# Patient Record
Sex: FEMALE | Race: WHITE | Employment: OTHER | URBAN - METROPOLITAN AREA
[De-identification: names, ages, dates, MRNs, and addresses within clinical notes are randomized per-mention and may not be internally consistent; named-entity substitution may affect disease eponyms.]

---

## 2017-12-20 ENCOUNTER — HOSPITAL ENCOUNTER (OUTPATIENT)
Dept: RADIATION ONCOLOGY | Age: 72
Discharge: HOME | End: 2017-12-20
Payer: MEDICARE

## 2017-12-20 VITALS
SYSTOLIC BLOOD PRESSURE: 138 MMHG | BODY MASS INDEX: 37.5 KG/M2 | DIASTOLIC BLOOD PRESSURE: 86 MMHG | HEIGHT: 60 IN | WEIGHT: 191 LBS | OXYGEN SATURATION: 98 % | RESPIRATION RATE: 17 BRPM | HEART RATE: 89 BPM | TEMPERATURE: 97 F

## 2017-12-20 PROCEDURE — 99212 OFFICE O/P EST SF 10 MIN: CPT | Performed by: RADIOLOGY

## 2017-12-20 RX ORDER — PRAVASTATIN SODIUM 40 MG
40 TABLET ORAL
COMMUNITY
Start: 2017-10-10 | End: 2021-06-16

## 2017-12-20 RX ORDER — ALLOPURINOL 100 MG/1
100 TABLET ORAL
COMMUNITY
Start: 2017-10-10 | End: 2021-06-16

## 2017-12-20 RX ORDER — GABAPENTIN 100 MG/1
100 CAPSULE ORAL 2 TIMES DAILY
COMMUNITY
End: 2017-12-20 | Stop reason: DRUGHIGH

## 2017-12-20 RX ORDER — GABAPENTIN 300 MG/1
300 CAPSULE ORAL 2 TIMES DAILY
COMMUNITY
Start: 2017-10-10

## 2017-12-20 RX ORDER — ROPINIROLE 1 MG/1
1 TABLET, FILM COATED ORAL NIGHTLY
COMMUNITY
Start: 2017-11-27

## 2017-12-20 RX ORDER — NICOTINE POLACRILEX 4 MG/1
20 GUM, CHEWING ORAL 2 TIMES DAILY
COMMUNITY

## 2017-12-20 NOTE — ONCOLOGY
RADIATION ONCOLOGY FOLLOW-UP    DATE OF VISIT: 12/20/2017    DIAGNOSIS: Diffuse large B-cell lymphoma left tonsil, s/p R-CHOP x 3 followed by involved field RT 30Gy in 15fx, completing 7/22/16.     TIME SINCE TREATMENT: 1.5 months     INTERVAL HISTORY:  the patient has not been seen for about a year. She has been following closely with Dr. Pauline Osborn. All of the radiation dose was low, the patient had a great difficulty getting through therapy secondary to loss of taste, nausea, and odynophagia. She required IV fluid hydration at medical oncology in that time period, and also spent some time in a nursing home after completion of therapy. Although we had discussed it at the time of consultation and radiation planning, the patient has no recollection of the need for fluoride trays and the risks of xerostomia and dental decay. She does note some dryness of her mouth, and she has substantial dental decay. She has not been followed with dentistry, and never had fluoride trays fitted. I would expect that her xerostomia would be minimal, as only involved field oropharyngeal radiotherapy was given, and overall the dose was low. Today she came in because her anterior tongue has been irritating her, and she had called and requested Magic Mouthwash. She believes this is related to distant radiotherapy. She also has some dental decay, and plans see a dentist in early 2018. She has had no recent chemotherapy.     PAST MEDICAL HISTORY:   Past Medical History:   Diagnosis Date    Anxiety     Cancer ([facility] [address]     stage1 DLBCL of left tonsil    Gout     Hyperlipidemia     Hypertension        PAST SURGICAL HISTORY:  Past Surgical History:   Procedure Laterality Date    APPENDECTOMY      CHOLECYSTECTOMY      HYSTERECTOMY, VAGINAL      JOINT REPLACEMENT          History   Smoking Status    Never Smoker   Smokeless Tobacco    Never Used     History   Alcohol Use No       ALLERGIES:   No Known Allergies     CURRENT near-complete extractions. It would be best for quality of life if a couple of teeth could be left in place in the upper and lower jaw, for better fitting of dentures. She plans to see a dentist shortly, and I imagine there will be a referral made to oral surgery to take care of whatever extractions are required. Viscous lidocaine was prescribed today to provide some comfort for the tongue irritation, until the cause of the problem can be addressed. Electronically signed by Demetrio Sullivan MD on 12/20/17 at 1:51 PM      Thank you for allowing us to participate in the care of this patient.   cc:   No att. providers found  MD Jordon Short, [address] [facility] [address]  [facility], [facility]

## 2020-11-18 ENCOUNTER — HOSPITAL ENCOUNTER (OUTPATIENT)
Dept: MRI IMAGING | Age: 75
Discharge: HOME | End: 2020-11-20
Payer: MEDICARE

## 2020-11-18 PROCEDURE — 70551 MRI BRAIN STEM W/O DYE: CPT

## 2020-11-23 ENCOUNTER — HOSPITAL ENCOUNTER (OUTPATIENT)
Dept: INTERVENTIONAL RADIOLOGY/VASCULAR | Age: 75
Discharge: HOME | End: 2020-11-25
Payer: MEDICARE

## 2020-11-23 PROCEDURE — 2709999900 IR PICC WO SQ PORT/PUMP > 5 YEARS

## 2020-11-23 PROCEDURE — 36573 INSJ PICC RS&I 5 YR+: CPT | Performed by: RADIOLOGY

## 2020-11-23 PROCEDURE — 2500000003 HC RX 250 WO HCPCS: Performed by: INTERNAL MEDICINE

## 2020-11-23 PROCEDURE — 2580000003 HC RX 258: Performed by: INTERNAL MEDICINE

## 2020-11-23 RX ORDER — LIDOCAINE HYDROCHLORIDE 20 MG/ML
5 INJECTION, SOLUTION INFILTRATION; PERINEURAL ONCE
Status: COMPLETED | OUTPATIENT
Start: 2020-11-23 | End: 2020-11-23

## 2020-11-23 RX ORDER — SODIUM CHLORIDE 9 MG/ML
250 INJECTION, SOLUTION INTRAVENOUS ONCE
Status: COMPLETED | OUTPATIENT
Start: 2020-11-23 | End: 2020-11-23

## 2020-11-23 RX ORDER — SODIUM CHLORIDE 0.9 % (FLUSH) 0.9 %
10 SYRINGE (ML) INJECTION PRN
Status: DISCONTINUED | OUTPATIENT
Start: 2020-11-23 | End: 2020-11-26 | Stop reason: HOSPADM

## 2020-11-23 RX ORDER — SODIUM CHLORIDE 0.9 % (FLUSH) 0.9 %
10 SYRINGE (ML) INJECTION EVERY 12 HOURS SCHEDULED
Status: DISCONTINUED | OUTPATIENT
Start: 2020-11-23 | End: 2020-11-26 | Stop reason: HOSPADM

## 2020-11-23 RX ADMIN — SODIUM CHLORIDE 250 ML: 9 INJECTION, SOLUTION INTRAVENOUS at 14:25

## 2020-11-23 RX ADMIN — LIDOCAINE HYDROCHLORIDE 5 ML: 20 INJECTION, SOLUTION INFILTRATION; PERINEURAL at 14:26

## 2020-11-23 ASSESSMENT — PAIN SCALES - GENERAL: PAINLEVEL_OUTOF10: 0

## 2021-01-13 ENCOUNTER — SOCIAL WORK (OUTPATIENT)
Dept: RADIATION ONCOLOGY | Age: 76
End: 2021-01-13

## 2021-01-13 NOTE — PROGRESS NOTES
GISELA referred to Pt via Dr. Asuncion Olmstead to offer support re: [employer]. GISELA met w/ Pt and sister-in-law who state a need to determine eligibility for Medicaid and/or caregiving resources. GISELA provided an educational pamphlet for [facility], which has an Aging and [address] Dept with [employer], who can assist with determining eligibility for various [employer] (including Medicaid). GISELA also provided an #800 number for The [employer]. This agency has various financial assistance programs and hardship funds of which Pt may be eligible. Sister-in-law agreed to call these two agencies on Pt behalf. GISELA provided contact # should future support needs arise. Family stated appreciation of resources.

## 2021-02-25 DIAGNOSIS — Z85.72 PERSONAL HISTORY OF NON-HODGKIN LYMPHOMAS: ICD-10-CM

## 2021-02-25 DIAGNOSIS — D64.9 ANEMIA, UNSPECIFIED TYPE: ICD-10-CM

## 2021-02-25 DIAGNOSIS — D59.9 ACQUIRED HEMOLYTIC ANEMIA (HCC): ICD-10-CM

## 2021-02-25 DIAGNOSIS — C83.35 DIFFUSE LARGE B-CELL LYMPHOMA OF LYMPH NODES OF INGUINAL REGION (HCC): ICD-10-CM

## 2021-02-25 DIAGNOSIS — M62.81 MUSCLE WEAKNESS (GENERALIZED): ICD-10-CM

## 2021-02-25 DIAGNOSIS — T45.1X5A ANEMIA DUE TO ANTINEOPLASTIC CHEMOTHERAPY: ICD-10-CM

## 2021-02-25 DIAGNOSIS — I82.401 ACUTE EMBOLISM AND THROMBOSIS OF UNSPECIFIED DEEP VEINS OF RIGHT LOWER EXTREMITY (HCC): ICD-10-CM

## 2021-02-25 DIAGNOSIS — D64.81 ANEMIA DUE TO ANTINEOPLASTIC CHEMOTHERAPY: ICD-10-CM

## 2021-02-25 DIAGNOSIS — C77.4 SECONDARY AND UNSPECIFIED MALIGNANT NEOPLASM OF INGUINAL AND LOWER LIMB LYMPH NODES (HCC): ICD-10-CM

## 2021-02-25 RX ORDER — ACETAMINOPHEN 325 MG/1
650 TABLET ORAL ONCE
Status: CANCELLED
Start: 2021-03-03 | End: 2021-03-02

## 2021-03-01 ENCOUNTER — HOSPITAL ENCOUNTER (OUTPATIENT)
Dept: INTERVENTIONAL RADIOLOGY/VASCULAR | Age: 76
Discharge: HOME | End: 2021-03-03
Payer: MEDICARE

## 2021-03-01 DIAGNOSIS — C83.35 DIFFUS LARGE B-CELL LYMPH, NODES OF ING RGN AND LOWER LIMB (HCC): ICD-10-CM

## 2021-03-01 PROCEDURE — 36573 INSJ PICC RS&I 5 YR+: CPT

## 2021-03-01 PROCEDURE — 2709999900 IR PICC WO SQ PORT/PUMP > 5 YEARS

## 2021-03-01 PROCEDURE — 2500000003 HC RX 250 WO HCPCS: Performed by: INTERNAL MEDICINE

## 2021-03-01 PROCEDURE — 2580000003 HC RX 258: Performed by: INTERNAL MEDICINE

## 2021-03-01 RX ORDER — SODIUM CHLORIDE 9 MG/ML
INJECTION, SOLUTION INTRAVENOUS CONTINUOUS
Status: DISCONTINUED | OUTPATIENT
Start: 2021-03-01 | End: 2021-03-04 | Stop reason: HOSPADM

## 2021-03-01 RX ORDER — LIDOCAINE HYDROCHLORIDE 20 MG/ML
5 INJECTION, SOLUTION INFILTRATION; PERINEURAL ONCE
Status: COMPLETED | OUTPATIENT
Start: 2021-03-01 | End: 2021-03-01

## 2021-03-01 RX ADMIN — SODIUM CHLORIDE: 9 INJECTION, SOLUTION INTRAVENOUS at 15:27

## 2021-03-01 RX ADMIN — LIDOCAINE HYDROCHLORIDE 5 ML: 20 INJECTION, SOLUTION INFILTRATION; PERINEURAL at 15:23

## 2021-03-01 NOTE — PROGRESS NOTES
1450 Pt here for PICC line insertion. Pt A&Ox4. Pt from [facility]. Allergies, history and medications verified with nursing home list. Pt on eliquis that was hold for 2 days. Pt states she had PICC lines in the past. Procedure explained. Consent signed.

## 2021-03-03 ENCOUNTER — HOSPITAL ENCOUNTER (OUTPATIENT)
Dept: INFUSION THERAPY | Age: 76
Setting detail: INFUSION SERIES
Discharge: HOME | End: 2021-03-03
Payer: MEDICARE

## 2021-03-03 VITALS
TEMPERATURE: 97.7 F | DIASTOLIC BLOOD PRESSURE: 67 MMHG | SYSTOLIC BLOOD PRESSURE: 139 MMHG | HEART RATE: 68 BPM | OXYGEN SATURATION: 99 % | RESPIRATION RATE: 18 BRPM

## 2021-03-03 DIAGNOSIS — C77.4 SECONDARY AND UNSPECIFIED MALIGNANT NEOPLASM OF INGUINAL AND LOWER LIMB LYMPH NODES (HCC): ICD-10-CM

## 2021-03-03 DIAGNOSIS — D64.81 ANEMIA DUE TO ANTINEOPLASTIC CHEMOTHERAPY: ICD-10-CM

## 2021-03-03 DIAGNOSIS — Z85.72 PERSONAL HISTORY OF NON-HODGKIN LYMPHOMAS: ICD-10-CM

## 2021-03-03 DIAGNOSIS — T45.1X5A ANEMIA DUE TO ANTINEOPLASTIC CHEMOTHERAPY: ICD-10-CM

## 2021-03-03 DIAGNOSIS — D64.9 ANEMIA, UNSPECIFIED TYPE: ICD-10-CM

## 2021-03-03 DIAGNOSIS — I82.401 ACUTE EMBOLISM AND THROMBOSIS OF UNSPECIFIED DEEP VEINS OF RIGHT LOWER EXTREMITY (HCC): ICD-10-CM

## 2021-03-03 DIAGNOSIS — D59.9 ACQUIRED HEMOLYTIC ANEMIA (HCC): Primary | ICD-10-CM

## 2021-03-03 DIAGNOSIS — C83.35 DIFFUSE LARGE B-CELL LYMPHOMA OF LYMPH NODES OF INGUINAL REGION (HCC): ICD-10-CM

## 2021-03-03 DIAGNOSIS — M62.81 MUSCLE WEAKNESS (GENERALIZED): ICD-10-CM

## 2021-03-03 PROCEDURE — 96375 TX/PRO/DX INJ NEW DRUG ADDON: CPT

## 2021-03-03 PROCEDURE — 96415 CHEMO IV INFUSION ADDL HR: CPT

## 2021-03-03 PROCEDURE — 6360000002 HC RX W HCPCS: Performed by: INTERNAL MEDICINE

## 2021-03-03 PROCEDURE — 96413 CHEMO IV INFUSION 1 HR: CPT

## 2021-03-03 PROCEDURE — 2580000003 HC RX 258

## 2021-03-03 PROCEDURE — 96417 CHEMO IV INFUS EACH ADDL SEQ: CPT

## 2021-03-03 PROCEDURE — 2580000003 HC RX 258: Performed by: INTERNAL MEDICINE

## 2021-03-03 PROCEDURE — 6370000000 HC RX 637 (ALT 250 FOR IP): Performed by: INTERNAL MEDICINE

## 2021-03-03 RX ORDER — LANOLIN ALCOHOL/MO/W.PET/CERES
3 CREAM (GRAM) TOPICAL DAILY
COMMUNITY
End: 2021-06-29

## 2021-03-03 RX ORDER — PREGABALIN 50 MG/1
50 CAPSULE ORAL 2 TIMES DAILY
COMMUNITY

## 2021-03-03 RX ORDER — ACETAMINOPHEN 325 MG/1
650 TABLET ORAL EVERY 4 HOURS PRN
COMMUNITY
End: 2021-06-16

## 2021-03-03 RX ORDER — SODIUM CHLORIDE 9 MG/ML
INJECTION, SOLUTION INTRAVENOUS
Status: COMPLETED
Start: 2021-03-03 | End: 2021-03-03

## 2021-03-03 RX ORDER — ACETAMINOPHEN 325 MG/1
650 TABLET ORAL ONCE
Status: COMPLETED | OUTPATIENT
Start: 2021-03-03 | End: 2021-03-03

## 2021-03-03 RX ORDER — DULOXETIN HYDROCHLORIDE 30 MG/1
30 CAPSULE, DELAYED RELEASE ORAL DAILY
COMMUNITY
End: 2021-06-29

## 2021-03-03 RX ADMIN — ACETAMINOPHEN 650 MG: 325 TABLET ORAL at 09:39

## 2021-03-03 RX ADMIN — BENDAMUSTINE HYDROCHLORIDE 80 MG: 25 INJECTION, SOLUTION INTRAVENOUS at 16:36

## 2021-03-03 RX ADMIN — DEXAMETHASONE SODIUM PHOSPHATE: 10 INJECTION INTRAMUSCULAR; INTRAVENOUS at 10:00

## 2021-03-03 RX ADMIN — PALONOSETRON 0.25 MG: 0.25 INJECTION, SOLUTION INTRAVENOUS at 09:37

## 2021-03-03 RX ADMIN — RITUXIMAB 700 MG: 10 INJECTION, SOLUTION INTRAVENOUS at 12:09

## 2021-03-03 RX ADMIN — SODIUM CHLORIDE 250 ML: 9 INJECTION, SOLUTION INTRAVENOUS at 16:38

## 2021-03-03 RX ADMIN — POLATUZUMAB VEDOTIN 166 MG: 140 INJECTION, POWDER, LYOPHILIZED, FOR SOLUTION INTRAVENOUS at 10:57

## 2021-03-03 RX ADMIN — SODIUM CHLORIDE 250 ML: 9 INJECTION, SOLUTION INTRAVENOUS at 10:27

## 2021-03-03 RX ADMIN — DIPHENHYDRAMINE HYDROCHLORIDE: 50 INJECTION, SOLUTION INTRAMUSCULAR; INTRAVENOUS at 10:26

## 2021-03-03 NOTE — PROGRESS NOTES
Pt to [facility] via w/c with Kimberly Anne,  for [place]. BP taken. Was low at 86/54, then 93/ 56. Called and spoke with Dr. Zainab Wood. Stated to stop metoprolol. lso stated is fine to proceed with treatment d/t fluids patient will be receiving with treatment.

## 2021-03-03 NOTE — PROGRESS NOTES
Pt has been sleeping since observation time began. Tolerating rituxan well. Started at 46 ml/hr. Now at 91 ml/hr.

## 2021-03-03 NOTE — PROGRESS NOTES
Spoke with April at [facility]. Instructed what Dr. Frankie Gonzales ordered about the metoprolol. Will write in on papers that will be sent back.

## 2021-03-03 NOTE — PROGRESS NOTES
Treatment completed. Pt tolerated well. Had called  at [phone], who arrived at 1. Pt left unit via w/c with . Will return tomorrow for day 2. No c/o or request. All equipment used in the care for this patient has been cleaned.

## 2021-03-04 ENCOUNTER — HOSPITAL ENCOUNTER (OUTPATIENT)
Dept: INFUSION THERAPY | Age: 76
Setting detail: INFUSION SERIES
Discharge: HOME | End: 2021-03-04
Payer: MEDICARE

## 2021-03-04 VITALS
TEMPERATURE: 97.8 F | HEART RATE: 60 BPM | RESPIRATION RATE: 18 BRPM | SYSTOLIC BLOOD PRESSURE: 116 MMHG | OXYGEN SATURATION: 98 % | DIASTOLIC BLOOD PRESSURE: 69 MMHG

## 2021-03-04 DIAGNOSIS — C83.35 DIFFUSE LARGE B-CELL LYMPHOMA OF LYMPH NODES OF INGUINAL REGION (HCC): ICD-10-CM

## 2021-03-04 DIAGNOSIS — D59.9 ACQUIRED HEMOLYTIC ANEMIA (HCC): Primary | ICD-10-CM

## 2021-03-04 DIAGNOSIS — T45.1X5A ANEMIA DUE TO ANTINEOPLASTIC CHEMOTHERAPY: ICD-10-CM

## 2021-03-04 DIAGNOSIS — I82.401 ACUTE EMBOLISM AND THROMBOSIS OF UNSPECIFIED DEEP VEINS OF RIGHT LOWER EXTREMITY (HCC): ICD-10-CM

## 2021-03-04 DIAGNOSIS — Z85.72 PERSONAL HISTORY OF NON-HODGKIN LYMPHOMAS: ICD-10-CM

## 2021-03-04 DIAGNOSIS — D64.81 ANEMIA DUE TO ANTINEOPLASTIC CHEMOTHERAPY: ICD-10-CM

## 2021-03-04 DIAGNOSIS — M62.81 MUSCLE WEAKNESS (GENERALIZED): ICD-10-CM

## 2021-03-04 DIAGNOSIS — D64.9 ANEMIA, UNSPECIFIED TYPE: ICD-10-CM

## 2021-03-04 DIAGNOSIS — C77.4 SECONDARY AND UNSPECIFIED MALIGNANT NEOPLASM OF INGUINAL AND LOWER LIMB LYMPH NODES (HCC): ICD-10-CM

## 2021-03-04 PROCEDURE — 96375 TX/PRO/DX INJ NEW DRUG ADDON: CPT

## 2021-03-04 PROCEDURE — 96409 CHEMO IV PUSH SNGL DRUG: CPT

## 2021-03-04 PROCEDURE — 2580000003 HC RX 258: Performed by: INTERNAL MEDICINE

## 2021-03-04 PROCEDURE — 6360000002 HC RX W HCPCS: Performed by: INTERNAL MEDICINE

## 2021-03-04 RX ORDER — SODIUM CHLORIDE 9 MG/ML
INJECTION, SOLUTION INTRAVENOUS
Status: DISPENSED
Start: 2021-03-04 | End: 2021-03-04

## 2021-03-04 RX ADMIN — DEXAMETHASONE SODIUM PHOSPHATE: 10 INJECTION INTRAMUSCULAR; INTRAVENOUS at 09:49

## 2021-03-04 RX ADMIN — BENDAMUSTINE HYDROCHLORIDE 80 MG: 25 INJECTION, SOLUTION INTRAVENOUS at 10:13

## 2021-03-04 NOTE — FLOWSHEET NOTE
Patient to the floor via wheelchair for her C4D2 chemo treatment. Vital signs taken. Denies any discomfort. Call light within reach.

## 2021-03-04 NOTE — FLOWSHEET NOTE
Patient left unit via wheelchair [facility]. All equipment cleaned after discharge.

## 2021-03-05 ENCOUNTER — HOSPITAL ENCOUNTER (OUTPATIENT)
Dept: INFUSION THERAPY | Age: 76
Setting detail: INFUSION SERIES
Discharge: HOME | End: 2021-03-05
Payer: COMMERCIAL

## 2021-03-05 VITALS
TEMPERATURE: 97 F | SYSTOLIC BLOOD PRESSURE: 148 MMHG | RESPIRATION RATE: 18 BRPM | DIASTOLIC BLOOD PRESSURE: 73 MMHG | HEART RATE: 77 BPM

## 2021-03-05 DIAGNOSIS — I82.401 ACUTE EMBOLISM AND THROMBOSIS OF UNSPECIFIED DEEP VEINS OF RIGHT LOWER EXTREMITY (HCC): ICD-10-CM

## 2021-03-05 DIAGNOSIS — D64.9 ANEMIA, UNSPECIFIED TYPE: ICD-10-CM

## 2021-03-05 DIAGNOSIS — T45.1X5A ANEMIA DUE TO ANTINEOPLASTIC CHEMOTHERAPY: ICD-10-CM

## 2021-03-05 DIAGNOSIS — D59.9 ACQUIRED HEMOLYTIC ANEMIA (HCC): Primary | ICD-10-CM

## 2021-03-05 DIAGNOSIS — M62.81 MUSCLE WEAKNESS (GENERALIZED): ICD-10-CM

## 2021-03-05 DIAGNOSIS — C83.35 DIFFUSE LARGE B-CELL LYMPHOMA OF LYMPH NODES OF INGUINAL REGION (HCC): ICD-10-CM

## 2021-03-05 DIAGNOSIS — Z85.72 PERSONAL HISTORY OF NON-HODGKIN LYMPHOMAS: ICD-10-CM

## 2021-03-05 DIAGNOSIS — C77.4 SECONDARY AND UNSPECIFIED MALIGNANT NEOPLASM OF INGUINAL AND LOWER LIMB LYMPH NODES (HCC): ICD-10-CM

## 2021-03-05 DIAGNOSIS — D64.81 ANEMIA DUE TO ANTINEOPLASTIC CHEMOTHERAPY: ICD-10-CM

## 2021-03-05 PROCEDURE — 6360000002 HC RX W HCPCS: Performed by: INTERNAL MEDICINE

## 2021-03-05 PROCEDURE — 96372 THER/PROPH/DIAG INJ SC/IM: CPT

## 2021-03-05 RX ADMIN — PEGFILGRASTIM 6 MG: 6 INJECTION SUBCUTANEOUS at 09:21

## 2021-03-05 NOTE — FLOWSHEET NOTE
Patient left unit via wheelchair with transporter assistance. All equipment cleaned after discharge.

## 2021-04-14 RX ORDER — ACETAMINOPHEN 325 MG/1
650 TABLET ORAL ONCE
Status: CANCELLED | OUTPATIENT
Start: 2021-04-21

## 2021-04-21 ENCOUNTER — HOSPITAL ENCOUNTER (OUTPATIENT)
Dept: INFUSION THERAPY | Age: 76
Setting detail: INFUSION SERIES
Discharge: HOME | End: 2021-04-21
Payer: MEDICARE

## 2021-04-21 VITALS
SYSTOLIC BLOOD PRESSURE: 144 MMHG | RESPIRATION RATE: 18 BRPM | TEMPERATURE: 98.1 F | DIASTOLIC BLOOD PRESSURE: 74 MMHG | HEART RATE: 81 BPM

## 2021-04-21 DIAGNOSIS — C77.4 SECONDARY AND UNSPECIFIED MALIGNANT NEOPLASM OF INGUINAL AND LOWER LIMB LYMPH NODES (HCC): ICD-10-CM

## 2021-04-21 DIAGNOSIS — T45.1X5A ANEMIA DUE TO ANTINEOPLASTIC CHEMOTHERAPY: ICD-10-CM

## 2021-04-21 DIAGNOSIS — D64.81 ANEMIA DUE TO ANTINEOPLASTIC CHEMOTHERAPY: ICD-10-CM

## 2021-04-21 DIAGNOSIS — I82.401 ACUTE EMBOLISM AND THROMBOSIS OF UNSPECIFIED DEEP VEINS OF RIGHT LOWER EXTREMITY (HCC): ICD-10-CM

## 2021-04-21 DIAGNOSIS — C83.35 DIFFUSE LARGE B-CELL LYMPHOMA OF LYMPH NODES OF INGUINAL REGION (HCC): ICD-10-CM

## 2021-04-21 DIAGNOSIS — Z85.72 PERSONAL HISTORY OF NON-HODGKIN LYMPHOMAS: ICD-10-CM

## 2021-04-21 DIAGNOSIS — D59.9 ACQUIRED HEMOLYTIC ANEMIA (HCC): Primary | ICD-10-CM

## 2021-04-21 DIAGNOSIS — M62.81 MUSCLE WEAKNESS (GENERALIZED): ICD-10-CM

## 2021-04-21 DIAGNOSIS — D64.9 ANEMIA, UNSPECIFIED TYPE: ICD-10-CM

## 2021-04-21 PROCEDURE — 96413 CHEMO IV INFUSION 1 HR: CPT

## 2021-04-21 PROCEDURE — 96375 TX/PRO/DX INJ NEW DRUG ADDON: CPT

## 2021-04-21 PROCEDURE — 6360000002 HC RX W HCPCS: Performed by: INTERNAL MEDICINE

## 2021-04-21 PROCEDURE — 96409 CHEMO IV PUSH SNGL DRUG: CPT

## 2021-04-21 PROCEDURE — 96415 CHEMO IV INFUSION ADDL HR: CPT

## 2021-04-21 PROCEDURE — 6370000000 HC RX 637 (ALT 250 FOR IP): Performed by: INTERNAL MEDICINE

## 2021-04-21 PROCEDURE — 2580000003 HC RX 258

## 2021-04-21 PROCEDURE — 96417 CHEMO IV INFUS EACH ADDL SEQ: CPT

## 2021-04-21 PROCEDURE — 2580000003 HC RX 258: Performed by: INTERNAL MEDICINE

## 2021-04-21 RX ORDER — SODIUM CHLORIDE 9 MG/ML
INJECTION, SOLUTION INTRAVENOUS
Status: COMPLETED
Start: 2021-04-21 | End: 2021-04-21

## 2021-04-21 RX ORDER — ACETAMINOPHEN 325 MG/1
650 TABLET ORAL ONCE
Status: COMPLETED | OUTPATIENT
Start: 2021-04-21 | End: 2021-04-21

## 2021-04-21 RX ADMIN — SODIUM CHLORIDE 250 ML: 9 INJECTION, SOLUTION INTRAVENOUS at 10:20

## 2021-04-21 RX ADMIN — DIPHENHYDRAMINE HYDROCHLORIDE: 50 INJECTION, SOLUTION INTRAMUSCULAR; INTRAVENOUS at 11:05

## 2021-04-21 RX ADMIN — DEXAMETHASONE SODIUM PHOSPHATE: 10 INJECTION INTRAMUSCULAR; INTRAVENOUS at 10:52

## 2021-04-21 RX ADMIN — BENDAMUSTINE HYDROCHLORIDE 80 MG: 25 INJECTION, SOLUTION INTRAVENOUS at 15:27

## 2021-04-21 RX ADMIN — POLATUZUMAB VEDOTIN 166 MG: 140 INJECTION, POWDER, LYOPHILIZED, FOR SOLUTION INTRAVENOUS at 11:30

## 2021-04-21 RX ADMIN — RITUXIMAB 705 MG: 10 INJECTION, SOLUTION INTRAVENOUS at 12:30

## 2021-04-21 RX ADMIN — ACETAMINOPHEN 650 MG: 325 TABLET ORAL at 10:22

## 2021-04-21 RX ADMIN — PALONOSETRON 0.25 MG: 0.05 INJECTION, SOLUTION INTRAVENOUS at 10:35

## 2021-04-21 ASSESSMENT — PAIN SCALES - GENERAL: PAINLEVEL_OUTOF10: 0

## 2021-04-21 NOTE — FLOWSHEET NOTE
Chemo treatment complete. Tolerated well. Left in a wheelchair with [facility]. All equipment used in the care for this patient has been cleaned.

## 2021-04-21 NOTE — FLOWSHEET NOTE
Patient to the floor via wheelchair for her cycle 5 day 1 chemo treatment. Vital signs taken. Denies any discomfort. Call light within reach.

## 2021-04-22 ENCOUNTER — HOSPITAL ENCOUNTER (OUTPATIENT)
Dept: INFUSION THERAPY | Age: 76
Setting detail: INFUSION SERIES
Discharge: HOME | End: 2021-04-22
Payer: MEDICARE

## 2021-04-22 VITALS
HEART RATE: 66 BPM | SYSTOLIC BLOOD PRESSURE: 108 MMHG | RESPIRATION RATE: 18 BRPM | DIASTOLIC BLOOD PRESSURE: 60 MMHG | TEMPERATURE: 98 F

## 2021-04-22 DIAGNOSIS — D64.81 ANEMIA DUE TO ANTINEOPLASTIC CHEMOTHERAPY: ICD-10-CM

## 2021-04-22 DIAGNOSIS — D64.9 ANEMIA, UNSPECIFIED TYPE: ICD-10-CM

## 2021-04-22 DIAGNOSIS — T45.1X5A ANEMIA DUE TO ANTINEOPLASTIC CHEMOTHERAPY: ICD-10-CM

## 2021-04-22 DIAGNOSIS — M62.81 MUSCLE WEAKNESS (GENERALIZED): ICD-10-CM

## 2021-04-22 DIAGNOSIS — Z85.72 PERSONAL HISTORY OF NON-HODGKIN LYMPHOMAS: ICD-10-CM

## 2021-04-22 DIAGNOSIS — D59.9 ACQUIRED HEMOLYTIC ANEMIA (HCC): Primary | ICD-10-CM

## 2021-04-22 DIAGNOSIS — C77.4 SECONDARY AND UNSPECIFIED MALIGNANT NEOPLASM OF INGUINAL AND LOWER LIMB LYMPH NODES (HCC): ICD-10-CM

## 2021-04-22 DIAGNOSIS — I82.401 ACUTE EMBOLISM AND THROMBOSIS OF UNSPECIFIED DEEP VEINS OF RIGHT LOWER EXTREMITY (HCC): ICD-10-CM

## 2021-04-22 DIAGNOSIS — C83.35 DIFFUSE LARGE B-CELL LYMPHOMA OF LYMPH NODES OF INGUINAL REGION (HCC): ICD-10-CM

## 2021-04-22 PROCEDURE — 96409 CHEMO IV PUSH SNGL DRUG: CPT

## 2021-04-22 PROCEDURE — 2580000003 HC RX 258: Performed by: INTERNAL MEDICINE

## 2021-04-22 PROCEDURE — 6360000002 HC RX W HCPCS: Performed by: INTERNAL MEDICINE

## 2021-04-22 PROCEDURE — 2580000003 HC RX 258

## 2021-04-22 PROCEDURE — 96375 TX/PRO/DX INJ NEW DRUG ADDON: CPT

## 2021-04-22 RX ORDER — SODIUM CHLORIDE 9 MG/ML
INJECTION, SOLUTION INTRAVENOUS
Status: COMPLETED
Start: 2021-04-22 | End: 2021-04-22

## 2021-04-22 RX ADMIN — BENDAMUSTINE HYDROCHLORIDE 80 MG: 25 INJECTION, SOLUTION INTRAVENOUS at 11:04

## 2021-04-22 RX ADMIN — SODIUM CHLORIDE 250 ML: 9 INJECTION, SOLUTION INTRAVENOUS at 10:21

## 2021-04-22 RX ADMIN — DEXAMETHASONE SODIUM PHOSPHATE: 10 INJECTION INTRAMUSCULAR; INTRAVENOUS at 10:22

## 2021-04-22 NOTE — FLOWSHEET NOTE
Patient to the floor via wheelchair for her Cycle 5 Day 2 chemo treatment. Vital signs taken. Denies any discomfort. Call light within reach.

## 2021-04-23 ENCOUNTER — HOSPITAL ENCOUNTER (OUTPATIENT)
Dept: INFUSION THERAPY | Age: 76
Setting detail: INFUSION SERIES
Discharge: HOME | End: 2021-04-23
Payer: COMMERCIAL

## 2021-04-23 VITALS
RESPIRATION RATE: 18 BRPM | SYSTOLIC BLOOD PRESSURE: 117 MMHG | HEART RATE: 75 BPM | TEMPERATURE: 97.9 F | DIASTOLIC BLOOD PRESSURE: 74 MMHG

## 2021-04-23 DIAGNOSIS — C83.35 DIFFUSE LARGE B-CELL LYMPHOMA OF LYMPH NODES OF INGUINAL REGION (HCC): ICD-10-CM

## 2021-04-23 DIAGNOSIS — C77.4 SECONDARY AND UNSPECIFIED MALIGNANT NEOPLASM OF INGUINAL AND LOWER LIMB LYMPH NODES (HCC): ICD-10-CM

## 2021-04-23 DIAGNOSIS — T45.1X5A ANEMIA DUE TO ANTINEOPLASTIC CHEMOTHERAPY: ICD-10-CM

## 2021-04-23 DIAGNOSIS — D59.9 ACQUIRED HEMOLYTIC ANEMIA (HCC): Primary | ICD-10-CM

## 2021-04-23 DIAGNOSIS — M62.81 MUSCLE WEAKNESS (GENERALIZED): ICD-10-CM

## 2021-04-23 DIAGNOSIS — I82.401 ACUTE EMBOLISM AND THROMBOSIS OF UNSPECIFIED DEEP VEINS OF RIGHT LOWER EXTREMITY (HCC): ICD-10-CM

## 2021-04-23 DIAGNOSIS — D64.81 ANEMIA DUE TO ANTINEOPLASTIC CHEMOTHERAPY: ICD-10-CM

## 2021-04-23 DIAGNOSIS — Z85.72 PERSONAL HISTORY OF NON-HODGKIN LYMPHOMAS: ICD-10-CM

## 2021-04-23 DIAGNOSIS — D64.9 ANEMIA, UNSPECIFIED TYPE: ICD-10-CM

## 2021-04-23 PROCEDURE — 6360000002 HC RX W HCPCS: Performed by: INTERNAL MEDICINE

## 2021-04-23 PROCEDURE — 96372 THER/PROPH/DIAG INJ SC/IM: CPT

## 2021-04-23 RX ADMIN — PEGFILGRASTIM 6 MG: 6 INJECTION SUBCUTANEOUS at 11:59

## 2021-04-23 NOTE — FLOWSHEET NOTE
Patient to the floor via wheelchair for her injection. Vital signs taken. Denies any discomfort. Call light within reach.

## 2021-06-16 ENCOUNTER — HOSPITAL ENCOUNTER (OUTPATIENT)
Dept: RADIATION ONCOLOGY | Age: 76
Discharge: HOME | End: 2021-06-16
Payer: MEDICARE

## 2021-06-16 VITALS
SYSTOLIC BLOOD PRESSURE: 111 MMHG | WEIGHT: 161 LBS | HEART RATE: 72 BPM | RESPIRATION RATE: 16 BRPM | OXYGEN SATURATION: 94 % | BODY MASS INDEX: 31.61 KG/M2 | TEMPERATURE: 97 F | HEIGHT: 60 IN | DIASTOLIC BLOOD PRESSURE: 61 MMHG

## 2021-06-16 PROCEDURE — 99212 OFFICE O/P EST SF 10 MIN: CPT | Performed by: RADIOLOGY

## 2021-06-16 RX ORDER — PRAVASTATIN SODIUM 40 MG
40 TABLET ORAL DAILY
COMMUNITY
Start: 2021-06-04 | End: 2021-07-06

## 2021-06-16 RX ORDER — TIZANIDINE 2 MG/1
2 TABLET ORAL 2 TIMES DAILY PRN
Status: ON HOLD | COMMUNITY
Start: 2021-06-04 | End: 2021-07-16

## 2021-06-16 RX ORDER — FUROSEMIDE 20 MG/1
20 TABLET ORAL 2 TIMES DAILY
COMMUNITY
Start: 2021-05-21

## 2021-06-16 RX ORDER — POTASSIUM CHLORIDE 750 MG/1
20 TABLET, FILM COATED, EXTENDED RELEASE ORAL 2 TIMES DAILY
COMMUNITY
Start: 2021-06-04

## 2021-06-16 RX ORDER — TRAZODONE HYDROCHLORIDE 50 MG/1
50 TABLET ORAL NIGHTLY
COMMUNITY
Start: 2021-06-14

## 2021-06-16 RX ORDER — AMLODIPINE BESYLATE 2.5 MG/1
2.5 TABLET ORAL DAILY
COMMUNITY
Start: 2021-06-14 | End: 2021-09-12

## 2021-06-16 RX ORDER — DIPHENOXYLATE HYDROCHLORIDE AND ATROPINE SULFATE 2.5; .025 MG/1; MG/1
1 TABLET ORAL
COMMUNITY
Start: 2021-01-08

## 2021-06-16 RX ORDER — ALLOPURINOL 100 MG/1
100 TABLET ORAL DAILY
COMMUNITY
Start: 2021-06-04 | End: 2021-07-06

## 2021-06-16 RX ORDER — PROCHLORPERAZINE MALEATE 10 MG
10 TABLET ORAL EVERY 8 HOURS PRN
COMMUNITY
Start: 2020-11-13

## 2021-06-16 NOTE — PROGRESS NOTES
SW introduced to Pt and dtr, Paula Cordero via Dr. Carl Jiang during radiation oncology consult. Pt will be scheduled for #5-10 Radiation treatments - SIM (planning) visit scheduled for 6/18/21. SW provided Pt a set of Advance Directives, at her request to review at home. SW will assist with completion at later date, if requested. SW provided emotional support surrounding grief issues related to the death of Pt's mother Nov. 2020 and the unexpected death of her son Feb. 7317 (due to complications of a possible stroke), which is the reason for today's distress score of #8. Sadness validated and normalized. Son's memorial service just occurred, so feelings of intensified grief resurfaced. Pt states good support from family - dtr and/or sis-in-law will assist with  transportation needs for upcoming medical flora'ts. There are no further issues, needs or concerns at this time.

## 2021-06-16 NOTE — PROGRESS NOTES
Radiation Oncology Consult Note         6/16/2021    Deysi Long  77 y.o.   1945    REFERRING PROVIDER: Beto Raines    PCP:  Elizabeth Simms MD    CHIEF COMPLAINT: \"My left leg is swollen. \"    DIAGNOSIS: Recurrent diffuse large B-cell lymphoma    STAGING:  History of 2016 Stage I left tonsil. Treatment history:  CHOP-R x 3 done 5-2016 6- through 7-:  30 Gy in 2-Gy fraction via IMRT to oropharynx                                  Admitted to [facility] 8-2020 for ? CVA (left facial numbness), was diagnosed with RLE DVT, exam showed L inguinal mass                                                Bx 10-7-2020 (+) recurrence.  PET/CT (+) sinuses, LLL lung, L inguinal, skin lesions                                                 In legs and back. Nisha Ricci, and Nathaly Wood November 2020 with good clinical response (with respect to skin lesions,                                                  Edema, and facial numbness. Admitted 1-2021 and 2-2021 with pneumonia & sepsis; PICC                                                  Line removed. Leptomeningeal disease treated with intrathecal chemo, 6 cycles completed 4-2021                                    6 weeks ago: In clincical remission. HISTORY OF PRESENT ILLNESS: Ms. Deysi Long  is a 77y.o. year old  female patient with the above initial diagnosis from 2016 and subsequent treatment history. A few weeks ago, she started having left lower extremity edema and numerous skin lesions. 5- PETCT:  Partial response to chemo except for a bulky L inguinal mass. L ext iliac/inguinal SUV max was 17.7 compared to 23.3 in October 2020, and the left kidney was non-functioning.                                      Medical oncology deemed patient not a transplant candidate, and radiation oncology consultation is requested regarding evaluation and palliative treatment recommendation. PAST MEDICAL HISTORY:      Diagnosis Date    Anxiety     Cancer [facility])     stage1 DLBCL of left tonsil    Cancer ([facility] [address] 2002    ovarian    DVT (deep venous thrombosis) (HCC)     Gout     Hyperlipidemia     Hypertension     Pulmonary embolism ([facility] [address]    She has a nonfunctioning left kidney. PAST SURGICAL HISTORY:      Procedure Laterality Date    APPENDECTOMY      CHOLECYSTECTOMY      HYSTERECTOMY, VAGINAL      JOINT REPLACEMENT Right 2001    Knee       Allergies   Allergen Reactions    Adipex-P [Phentermine]      Kidney failure    Ciprofloxacin     Lisinopril      caugh    Oxycodone     Percocet [Oxycodone-Acetaminophen]      itching       MEDICATIONS:  Medications reviewed and reconciled. Current Outpatient Medications   Medication Sig Dispense Refill    amLODIPine (NORVASC) 2.5 MG tablet Take 2.5 mg by mouth daily      diphenoxylate-atropine (LOMOTIL) 2.5-0.025 MG per tablet Take 1 tablet by mouth.  furosemide (LASIX) 20 MG tablet Take 20 mg by mouth daily      potassium chloride (KLOR-CON) 10 MEQ extended release tablet Take 20 mEq by mouth daily      metoprolol tartrate (LOPRESSOR) 25 MG tablet Take 25 mg by mouth 2 times daily      prochlorperazine (COMPAZINE) 10 MG tablet Take 10 mg by mouth every 8 hours as needed      tiZANidine (ZANAFLEX) 2 MG tablet Take 2 mg by mouth 2 times daily as needed      DULoxetine (CYMBALTA) 30 MG extended release capsule Take 30 mg by mouth daily In a.m.      apixaban (ELIQUIS) 5 MG TABS tablet Take 5 mg by mouth 2 times daily      pregabalin (LYRICA) 50 MG capsule Take 50 mg by mouth 2 times daily.       melatonin 3 MG TABS tablet Take 3 mg by mouth daily At bedtime      rOPINIRole (REQUIP) 1 MG tablet Take 1 mg by mouth Nursing home orders states 0.5 mg PO at HS for RLS, 1 mg PO at HS for RLS x 7 days, @mg at HS for RLS      gabapentin (NEURONTIN) 300 MG capsule Take 300 mg by mouth      omeprazole 20 MG EC tablet Take 20 mg by mouth 2 times daily      allopurinol (ZYLOPRIM) 100 MG tablet Take 100 mg by mouth daily      pravastatin (PRAVACHOL) 40 MG tablet Take 40 mg by mouth daily      traZODone (DESYREL) 50 MG tablet 90TAKE 1/2 (ONE-HALF) OF A TABLET BY MOUTH AT BEDTIME       No current facility-administered medications for this encounter. FAMILY HISTORY:  Family History   Problem Relation Age of Onset    Cancer Mother     Cancer Father     Cancer Maternal Grandmother    Father  at age 80 of sepsis with a history of bladder cancer. Mother  at age 80 of Alzheimer's, no history of malignancies. Patient denies any other family history of malignancies. SOCIAL HISTORY:    A  of 15 years with 3 children (2 daughters alive, 1 son  at age 46 this past February due to a stroke), the patient has retired from working as a maid and . She never smoked, consumes wine occasionally, and her primary caregivers are her daughter and the daughters sven, both of whom have moved in with her. REVIEW OF SYSTEMS:  Obtained from the patient, chart review and nursing assessment. ECOG Performance Status 3  Constitutional: Fair appetite, 43 pound weight loss over the past 3 months due to chemo. HEENT:  No headache or visual symptoms  Respiratory:  No cough or hemoptysis  Cardiovascular:  No chest pain, orthopnea, or paroxysmal nocturnal dyspnea  GI:  No incontinence, hematochezia, or diarrhea  :  No incontinence, hematuria, or dysuria  Lymph:  No edema  Neuro: No pain or paresthesias but the patient has numbness of toes and fingers from chemotherapy.   Psychiatric:  No psychiatric illness    PHYSICAL EXAMINATION:      Vitals:    21 1049   BP: 111/61   Pulse: 72   Resp: 16   Temp: 97 °F (36.1 °C)   SpO2: 94%   Weight: 161 lb (73 kg)   Height: 5' (1.524 m)       Wt Readings from Last 3 Encounters:   21 161 lb (73 kg)   17 191 lb (86.6 kg)       ECOG PERFORMANCE STATUS:  3    Constitutional: 77 y.o. female who is alert, cooperative and in no apparent distress. She is accompanied by her daughter, Pasquale Murillo. HEENT:   No jaundice or icterus. Pupils are equal and reactive. The patient is edentulous. Cranial nerves II-XII are grossly intact. Lymph: A golf ball-sized left inguinal mass is palpated. It is nontender and mobile. There is no palpable adenopathy in the neck, supraclavicular, infraclavicular, axillary, right inguinal, epitrochlear, or popliteal regions. Heart Exam shows regular rate and rhythm without murmurs, rubs, or gallop. Lungs  are clear to auscultation. Abdomen exam is suboptimal because of obesity but shows a non-distended non-tender abdomen with positive bowel sounds. Extremities:   Without clubbing or cyanosis but there is difficult edema in the left lower extremity, where there are numerous skin lesions that are fixed to the skin, pink to purplish in color, and nontender. Neuro Exam:  Shows strength 5/5 proximally and distally in all 4 extremities, and sensory is grossly normal for light touch and deep pressure. RADIATION SAFETY AND ONCOLOGIC TREATMENT SUPPORT:    - Previous radiation history:  No  - History of autoimmune or connective tissue disease:  No  - Nutritional support/ PEG:  Not applicable  - Dental evaluation:  Not applicable  -  requested:  Not asked for.  - Oncology Nurse navigator requested:  Yes  - Transportation for daily treatment:  Self    IMAGING REVIEWED: I went over the results of her most recent PET CT scan with the patient and her daughter. PATHOLOGY REVIEWED: I went over the results of her previous biopsies with the patient and her daughter.     IMPRESSION: 67-year-old  female patient diagnosed in 2016 with left tonsillar diffuse large B cell non-Hodgkin's lymphoma stage I, status post CHOP-R x3 completed in May 2016, followed by 30 Gy in 2 Gy daily fractions completed July 22, 2016, subsequently with diffuse recurrence, treated with multiagent chemotherapy in November 2020 with good response shortly intrathecal chemotherapy treated April 2021, now with low extremity worsening edema, recurrent skin lesions at the, with persistent left inguinal/external iliac mass positive on May 19, 2021 PET/CT. DISCUSSION/PLAN:   I went over the diagnosis, nature and palliative intent of radiation, the fact that the left inguinal/external iliac adenopathy may not be responsible for the left lower extremity edema as she already has dermal metastases meaning that the tumor is already in the dermal lymphatics, and therefore, left lower extremity edema may not respond to radiation treatment to the left inguinal mass. However, that mass being a possible and treatable cause of the left lower extremity edema, and since radiation carries relatively few morbidities, I went over the indications for, risk, benefits, acute chronic side effects of radiation with the patient and her daughter. They understand, have all of their questions answered to the satisfaction, and would like to proceed. A written consent was obtained. I recommend 30 Gy in 3-Gy daily fractions employing 3D conformal treatment field arrangement. For that purpose, I recommend a treatment planning CT for 3D dosimetry planning. Radiation will start as soon as computerized dosimetry is completed, and a radiation treatment summary will be provided at the end of her radiation treatment course. Thank you very much for asking us to participate in the care of this patient.       Sarah Munson MD PHD  Radiation Oncologist  Diplomate, American Board of Radiology -- Radiation Oncology    Department of [address] [place] [address], Jorge Romano

## 2021-06-16 NOTE — CONSULTS
NURSING ASSESSMENT     Date: 2021        Patient Name: Angelica Blevins     YOB: 1945      Age:  77 y.o. MRN: [id number]     Chaperone [x] Yes   [] No      Advance Directives:   Do you currently have completed advance directives (living will)? [] Yes   [x] No         *If yes, please bring us a copy for your records. *If no, would you like info or assistance in completing advance directives (living will)? [] Yes   [x] No    Pain Score:   Pain Score (1-10): 0  Pain Location: 0   Pain Duration:0  Pain Management/Control: 0      Is pain affecting your ability to take care of yourself or move throughout your home?                         [] Yes   [x] No    General: Weight Loss, Weakness and Fatigue  Patient has gained weight [] Yes   [x] No  Patient has lost weight [x] Yes   [] No  How much weight in pounds and over what length of time: Down 43 lbs in 3 months    Eyes (Ophthalmic): Blurred Vision, needs a new pair of glasses     Skin (Dermatological):3-4+ left LE edema with growths to that same leg     ENT: Dentures (uppers and lowers)     Respiratory: PIERRE and Oxygen at 3L at night and as needed     Cardiovascular: Irregular heart beat, H/O PE and Blood clot to right leg      Device   [] Yes   [x] No   Copy of Card Obtained [] Yes   [x] No    Gastrointestinal: No Problems now, had consistent diarrhea during chemo treatment    Genito-Urinary:    No Problems       Breast: No Problems     Musculoskeletal: Immobility and Joint Pain    Neurological: Numbness and Tingling to hands and feet      Hematological and Lymphatic: Prior Transfusion and Swelling in Groin Left     Endocrine: No Problems     Gyn History:   /Para: 3/3  Age of Menarche: 16  Age of Menopause   Vaginal Bleeding: No   First Pregnancy: 22  Breast Feeding:  No  Last Menstrual period:   Oral Contraceptives: No     Number of years:   Hormone Replacement therapy: NO     Number of Years:   Last Pap Smear: unknown  Last Mammogram 2 years ago    A 10-point review of systems has been conducted and pertinent positives have been   recorded. All other review of systems are negative    Was the patient admitted during the course of treatment OR within 30 days of treatment?  No        Additional Comments:

## 2021-06-18 ENCOUNTER — HOSPITAL ENCOUNTER (OUTPATIENT)
Dept: RADIATION ONCOLOGY | Age: 76
Discharge: HOME | End: 2021-06-18
Attending: RADIOLOGY
Payer: MEDICARE

## 2021-06-18 PROCEDURE — 77399 UNLISTED PX MED RADJ PHYSICS: CPT | Performed by: RADIOLOGY

## 2021-06-18 PROCEDURE — 77290 THER RAD SIMULAJ FIELD CPLX: CPT | Performed by: RADIOLOGY

## 2021-06-18 PROCEDURE — 77334 RADIATION TREATMENT AID(S): CPT | Performed by: RADIOLOGY

## 2021-06-18 NOTE — PROGRESS NOTES
Teaching & Instructions - Pelvis  General  Simulation  Initial Treatment  Self-Care Info  Nutrition  Social Service    Site-Specific  Side Effects  Cystitis Mgmt  Bowel Mgmt  Fatigue Mgmt  Perineal/Vaginal Care  Proctitis Mgmt  Skin Care      Educational Handouts  Radiation Therapy & You  Site Specific Instructions  Radiation Oncology Dept Information  CBMS Program    Patient eager to learn, verbalized understanding of verbal education and handouts.

## 2021-06-22 PROCEDURE — 77295 3-D RADIOTHERAPY PLAN: CPT | Performed by: RADIOLOGY

## 2021-06-22 PROCEDURE — 77334 RADIATION TREATMENT AID(S): CPT | Performed by: RADIOLOGY

## 2021-06-22 PROCEDURE — 77300 RADIATION THERAPY DOSE PLAN: CPT | Performed by: RADIOLOGY

## 2021-06-23 ENCOUNTER — HOSPITAL ENCOUNTER (OUTPATIENT)
Dept: RADIATION ONCOLOGY | Age: 76
Discharge: HOME | End: 2021-06-23
Attending: RADIOLOGY
Payer: MEDICARE

## 2021-06-23 PROCEDURE — 77280 THER RAD SIMULAJ FIELD SMPL: CPT | Performed by: RADIOLOGY

## 2021-06-24 ENCOUNTER — HOSPITAL ENCOUNTER (OUTPATIENT)
Dept: RADIATION ONCOLOGY | Age: 76
Discharge: HOME | End: 2021-06-24
Attending: RADIOLOGY
Payer: MEDICARE

## 2021-06-24 PROCEDURE — 77412 RADIATION TX DELIVERY LVL 3: CPT | Performed by: RADIOLOGY

## 2021-06-25 ENCOUNTER — HOSPITAL ENCOUNTER (OUTPATIENT)
Dept: RADIATION ONCOLOGY | Age: 76
Discharge: HOME | End: 2021-06-25
Attending: RADIOLOGY
Payer: MEDICARE

## 2021-06-25 PROCEDURE — 77412 RADIATION TX DELIVERY LVL 3: CPT | Performed by: RADIOLOGY

## 2021-06-25 PROCEDURE — 77387 GUIDANCE FOR RADJ TX DLVR: CPT | Performed by: RADIOLOGY

## 2021-06-28 ENCOUNTER — HOSPITAL ENCOUNTER (OUTPATIENT)
Dept: RADIATION ONCOLOGY | Age: 76
Discharge: HOME | End: 2021-06-28
Attending: RADIOLOGY
Payer: MEDICARE

## 2021-06-28 PROCEDURE — 77412 RADIATION TX DELIVERY LVL 3: CPT | Performed by: RADIOLOGY

## 2021-06-29 ENCOUNTER — HOSPITAL ENCOUNTER (OUTPATIENT)
Dept: RADIATION ONCOLOGY | Age: 76
Discharge: HOME | End: 2021-06-29
Attending: RADIOLOGY
Payer: MEDICARE

## 2021-06-29 ENCOUNTER — HOSPITAL ENCOUNTER (OUTPATIENT)
Dept: RADIATION ONCOLOGY | Age: 76
Discharge: HOME | End: 2021-06-29
Payer: MEDICARE

## 2021-06-29 PROCEDURE — 77412 RADIATION TX DELIVERY LVL 3: CPT | Performed by: RADIOLOGY

## 2021-06-29 PROCEDURE — 77427 RADIATION TX MANAGEMENT X5: CPT | Performed by: RADIOLOGY

## 2021-06-29 NOTE — PROGRESS NOTES
DEPARTMENT OF RADIATION ONCOLOGY   ON TREATMENT VISIT       6/29/2021      NAME:  Gerda Mendoza    YOB: 1945    DIAGNOSIS: Recurrent diffuse large B-cell lymphoma     STAGING:  History of 2016 Stage I left tonsil.     Treatment history:  CHOP-R x 3 done 5-2016 6- through 7-:  30 Gy in 2-Gy fraction via IMRT to oropharynx                                  Admitted to [facility] 8-2020 for ? CVA (left facial numbness), was diagnosed with RLE DVT, exam showed L inguinal mass                                                Bx 10-7-2020 (+) recurrence.  PET/CT (+) sinuses, LLL lung, L inguinal, skin lesions                                                 In legs and back. Phipatricias Sires, and Jose Arroyo November 2020 with good clinical response (with respect to skin lesions,                                                  Edema, and facial numbness. Admitted 1-2021 and 2-2021 with pneumonia & sepsis; PICC                                                  Line removed. Leptomeningeal disease treated with intrathecal chemo, 6 cycles completed 4-2021                                    6 weeks ago: In clincical remission. SUBJECTIVE:   Gerda Mendoza has now received 1200 cGy in 300-cGy daily fractions directed to the left inguinal LN for management of the above diagnosis. There has been no change in the left lower extremity edema. The patient denies any inguinal skin problems with radiation. Past medical, surgical, social and family histories reviewed and updated as indicated.     PAIN: 0    ALLERGIES:  Adipex-p [phentermine], Ciprofloxacin, Lisinopril, Oxycodone, and Percocet [oxycodone-acetaminophen]         Current Outpatient Medications   Medication Sig Dispense Refill    allopurinol (ZYLOPRIM) 100 MG tablet Take 100 mg by mouth daily      amLODIPine (NORVASC) 2.5 MG tablet Take 2.5 mg by mouth daily      diphenoxylate-atropine (LOMOTIL) 2.5-0.025 MG per tablet Take 1 tablet by mouth.  furosemide (LASIX) 20 MG tablet Take 20 mg by mouth daily      potassium chloride (KLOR-CON) 10 MEQ extended release tablet Take 20 mEq by mouth daily      metoprolol tartrate (LOPRESSOR) 25 MG tablet Take 25 mg by mouth 2 times daily      prochlorperazine (COMPAZINE) 10 MG tablet Take 10 mg by mouth every 8 hours as needed      pravastatin (PRAVACHOL) 40 MG tablet Take 40 mg by mouth daily      tiZANidine (ZANAFLEX) 2 MG tablet Take 2 mg by mouth 2 times daily as needed      traZODone (DESYREL) 50 MG tablet 90TAKE 1/2 (ONE-HALF) OF A TABLET BY MOUTH AT BEDTIME      DULoxetine (CYMBALTA) 30 MG extended release capsule Take 30 mg by mouth daily In a.m.      apixaban (ELIQUIS) 5 MG TABS tablet Take 5 mg by mouth 2 times daily      pregabalin (LYRICA) 50 MG capsule Take 50 mg by mouth 2 times daily.  melatonin 3 MG TABS tablet Take 3 mg by mouth daily At bedtime      rOPINIRole (REQUIP) 1 MG tablet Take 1 mg by mouth Nursing home orders states 0.5 mg PO at HS for RLS, 1 mg PO at HS for RLS x 7 days, @mg at HS for RLS      gabapentin (NEURONTIN) 300 MG capsule Take 300 mg by mouth      omeprazole 20 MG EC tablet Take 20 mg by mouth 2 times daily       No current facility-administered medications for this encounter. OBJECTIVE:  Alert, pleasant, and conversant. Physical Examination: Temperature 96.5 F, blood pressure 133/60, pulse 70, respirations 16, weight 170 pounds per RN. Constitutional: 77 y.o. female who is alert, cooperative and in no apparent distress. The left lower extremity edema is unchanged. ASSESSMENT/PLAN:   Patient is tolerating radiation treatment. I am informed by our department RN that medical oncology will be evaluating the patient for possible DVT. RT is to continue as planned.     Jackie Lozada MD PhD  Radiation Oncologist, [address] [place] [address]    Department of

## 2021-06-29 NOTE — PROGRESS NOTES
UNSPECIFIED CAREPATH ON TREATMENT VISIT         WEEK: 1  DOSE: 1200 cGy        CONCURRENT THERAPY:  none        CARDIAC DEVICE:  none                  ASESMENT OF SIDE EFFECTS                      SCORE EVALUATION      MUCOSITIS ORAL  0        RADIATION DERMATITIS  0        NAUSEA    1 Compazine helps twice a day       VOMITING   No vomitting this week       ANOREXIA    0 Good appetite       WEIGHT LOSS   0        PRURITIS  1 Lesions left lower leg       FATIGUE  1 Not sleeping well       OTHER   Left leg more swollen the past 2 days       PAIN  6 Left leg, does not have any pain medication       NUTRITION   Regular diet       SATISFIED WITH PAIN MANAGEMENT:  PAIN MEDS:          WEIGHT  INITIAL WIEGHT LAST WEEKS WIEGHT       VITAL SIGNS          TEMP: 96.5 B/P: 133/60 RESP: 16 PULSE: 70 [address]      SMOKING : no  IF YES HOW MUCH? PATIENT ABLE TO STATE SELF-CARE MEASURES? met         MEDICATION LIST REVIEWED?  yes

## 2021-06-30 ENCOUNTER — HOSPITAL ENCOUNTER (OUTPATIENT)
Dept: RADIATION ONCOLOGY | Age: 76
Discharge: HOME | End: 2021-06-30
Attending: RADIOLOGY
Payer: MEDICARE

## 2021-06-30 PROCEDURE — 77336 RADIATION PHYSICS CONSULT: CPT | Performed by: RADIOLOGY

## 2021-06-30 PROCEDURE — 77412 RADIATION TX DELIVERY LVL 3: CPT | Performed by: RADIOLOGY

## 2021-07-01 ENCOUNTER — HOSPITAL ENCOUNTER (OUTPATIENT)
Dept: RADIATION ONCOLOGY | Age: 76
Discharge: HOME | End: 2021-07-01
Attending: RADIOLOGY
Payer: MEDICARE

## 2021-07-01 PROCEDURE — 77412 RADIATION TX DELIVERY LVL 3: CPT | Performed by: RADIOLOGY

## 2021-07-02 ENCOUNTER — HOSPITAL ENCOUNTER (OUTPATIENT)
Dept: RADIATION ONCOLOGY | Age: 76
Discharge: HOME | End: 2021-07-02
Attending: RADIOLOGY
Payer: MEDICARE

## 2021-07-02 PROCEDURE — 77412 RADIATION TX DELIVERY LVL 3: CPT | Performed by: RADIOLOGY

## 2021-07-06 ENCOUNTER — HOSPITAL ENCOUNTER (OUTPATIENT)
Dept: RADIATION ONCOLOGY | Age: 76
Discharge: HOME | End: 2021-07-06
Attending: RADIOLOGY
Payer: MEDICARE

## 2021-07-06 PROCEDURE — 77412 RADIATION TX DELIVERY LVL 3: CPT | Performed by: RADIOLOGY

## 2021-07-06 PROCEDURE — 77427 RADIATION TX MANAGEMENT X5: CPT | Performed by: RADIOLOGY

## 2021-07-06 NOTE — PROGRESS NOTES
DEPARTMENT OF RADIATION ONCOLOGY   ON TREATMENT VISIT       7/6/2021      NAME:  Marian Brown    YOB: 1945    DIAGNOSIS: Recurrent diffuse large B-cell lymphoma     STAGING:  History of 2016 Stage I left tonsil.     Treatment history:  CHOP-R x 3 done 5-2016 6- through 7-:  30 Gy in 2-Gy fraction via IMRT to oropharynx                                  Admitted to [facility] 8-2020 for ? CVA (left facial numbness), was diagnosed with RLE DVT, exam showed L inguinal mass                                                Bx 10-7-2020 (+) recurrence.   PET/CT (+) sinuses, LLL lung, L inguinal, skin lesions                                                 In legs and back.                                   Bendeka, Rituxan, and Polivy November 2020 with good clinical response (with respect to skin lesions,                                                  Edema, and facial numbness.  Admitted 1-2021 and 2-2021 with pneumonia & sepsis; PICC                                                  Line removed.                                    Leptomeningeal disease treated with intrathecal chemo, 6 cycles completed 4-2021                                    6 weeks ago:  In clincical remission.     SUBJECTIVE:   Marian Brown has now received 2400 cGy in 300-cGy daily fractions directed to the left inguinal LN for management of the above diagnosis. Patient complains of significant pain in the left leg, with worsening edema. Pain increases with weightbearing, is being managed by Yaneth Johnson, and will soon be managed by palliative care, the order for which has been written by medical oncology. The patient is being treated for UTI by her family doctor. Her left lower extremity edema is also being evaluated for possible DVT by medical oncology. Past medical, surgical, social and family histories reviewed and updated as indicated.     PAIN: 8    ALLERGIES:  Adipex-p [phentermine], Ciprofloxacin, Lisinopril, Oxycodone, and Percocet [oxycodone-acetaminophen]         Current Outpatient Medications   Medication Sig Dispense Refill    amLODIPine (NORVASC) 2.5 MG tablet Take 2.5 mg by mouth daily      furosemide (LASIX) 20 MG tablet Take 20 mg by mouth daily      potassium chloride (KLOR-CON) 10 MEQ extended release tablet Take 20 mEq by mouth daily      metoprolol tartrate (LOPRESSOR) 25 MG tablet Take 25 mg by mouth 2 times daily      prochlorperazine (COMPAZINE) 10 MG tablet Take 10 mg by mouth every 8 hours as needed      tiZANidine (ZANAFLEX) 2 MG tablet Take 2 mg by mouth 2 times daily as needed      traZODone (DESYREL) 50 MG tablet 90TAKE 1/2 (ONE-HALF) OF A TABLET BY MOUTH AT BEDTIME      apixaban (ELIQUIS) 5 MG TABS tablet Take 5 mg by mouth 2 times daily      pregabalin (LYRICA) 50 MG capsule Take 50 mg by mouth 2 times daily.  rOPINIRole (REQUIP) 1 MG tablet Take 1 mg by mouth Nursing home orders states 0.5 mg PO at HS for RLS, 1 mg PO at HS for RLS x 7 days, @mg at HS for RLS      gabapentin (NEURONTIN) 300 MG capsule Take 300 mg by mouth      omeprazole 20 MG EC tablet Take 20 mg by mouth 2 times daily      Ibuprofen-diphenhydrAMINE Cit (ADVIL PM PO) Take 2 tablets by mouth nightly as needed      diphenoxylate-atropine (LOMOTIL) 2.5-0.025 MG per tablet Take 1 tablet by mouth. No current facility-administered medications for this encounter. OBJECTIVE:    Physical Examination: Samantha Ervin is 7.1F, blood pressure 108/55, pulse 88, respiration 18, weight 177 pounds per RN. Constitutional: 77 y.o. female who is alert, cooperative and in no apparent distress. The patient sits in wheelchair in no acute distress. The left lower extremity edema makes the left lower extremity approximately twice the size of the right.     ASSESSMENT/PLAN:   Patient is doing well, tolerating radiation treatment, with the left lower extremity edema being evaluated by neurology for possible DVT. RT is to continue as planned.     Vanessa Malik MD PhD  Radiation Oncologist, [address] [place] [address]

## 2021-07-07 ENCOUNTER — HOSPITAL ENCOUNTER (OUTPATIENT)
Dept: RADIATION ONCOLOGY | Age: 76
Discharge: HOME | End: 2021-07-07
Attending: RADIOLOGY
Payer: MEDICARE

## 2021-07-07 PROCEDURE — 77412 RADIATION TX DELIVERY LVL 3: CPT | Performed by: RADIOLOGY

## 2021-07-08 ENCOUNTER — HOSPITAL ENCOUNTER (OUTPATIENT)
Dept: RADIATION ONCOLOGY | Age: 76
Discharge: HOME | End: 2021-07-08
Attending: RADIOLOGY
Payer: MEDICARE

## 2021-07-08 ENCOUNTER — CLINICAL DOCUMENTATION (OUTPATIENT)
Dept: RADIATION ONCOLOGY | Age: 76
End: 2021-07-08

## 2021-07-08 PROCEDURE — 77412 RADIATION TX DELIVERY LVL 3: CPT | Performed by: RADIOLOGY

## 2021-07-12 ENCOUNTER — INITIAL CONSULT (OUTPATIENT)
Dept: INTERVENTIONAL RADIOLOGY/VASCULAR | Age: 76
End: 2021-07-12
Payer: MEDICARE

## 2021-07-12 ENCOUNTER — PREP FOR PROCEDURE (OUTPATIENT)
Dept: INTERVENTIONAL RADIOLOGY/VASCULAR | Age: 76
End: 2021-07-12

## 2021-07-12 VITALS
BODY MASS INDEX: 31.44 KG/M2 | DIASTOLIC BLOOD PRESSURE: 61 MMHG | HEART RATE: 72 BPM | SYSTOLIC BLOOD PRESSURE: 111 MMHG | WEIGHT: 161 LBS

## 2021-07-12 DIAGNOSIS — C83.35 DIFFUSE LARGE B-CELL LYMPHOMA OF LYMPH NODES OF INGUINAL REGION (HCC): ICD-10-CM

## 2021-07-12 DIAGNOSIS — C77.4 SECONDARY AND UNSPECIFIED MALIGNANT NEOPLASM OF INGUINAL AND LOWER LIMB LYMPH NODES (HCC): ICD-10-CM

## 2021-07-12 DIAGNOSIS — D59.9 ACQUIRED HEMOLYTIC ANEMIA (HCC): ICD-10-CM

## 2021-07-12 DIAGNOSIS — I87.8 POOR VENOUS ACCESS: Primary | ICD-10-CM

## 2021-07-12 DIAGNOSIS — Z01.818 ENCOUNTER FOR PREADMISSION TESTING: ICD-10-CM

## 2021-07-12 DIAGNOSIS — I82.401 ACUTE EMBOLISM AND THROMBOSIS OF UNSPECIFIED DEEP VEINS OF RIGHT LOWER EXTREMITY (HCC): ICD-10-CM

## 2021-07-12 DIAGNOSIS — Z85.72 PERSONAL HISTORY OF NON-HODGKIN LYMPHOMAS: ICD-10-CM

## 2021-07-12 PROCEDURE — 1123F ACP DISCUSS/DSCN MKR DOCD: CPT | Performed by: NURSE PRACTITIONER

## 2021-07-12 PROCEDURE — G8419 CALC BMI OUT NRM PARAM NOF/U: HCPCS | Performed by: NURSE PRACTITIONER

## 2021-07-12 PROCEDURE — 4040F PNEUMOC VAC/ADMIN/RCVD: CPT | Performed by: NURSE PRACTITIONER

## 2021-07-12 PROCEDURE — 3017F COLORECTAL CA SCREEN DOC REV: CPT | Performed by: NURSE PRACTITIONER

## 2021-07-12 PROCEDURE — 1036F TOBACCO NON-USER: CPT | Performed by: NURSE PRACTITIONER

## 2021-07-12 PROCEDURE — 99204 OFFICE O/P NEW MOD 45 MIN: CPT | Performed by: NURSE PRACTITIONER

## 2021-07-12 PROCEDURE — G8400 PT W/DXA NO RESULTS DOC: HCPCS | Performed by: NURSE PRACTITIONER

## 2021-07-12 PROCEDURE — 1090F PRES/ABSN URINE INCON ASSESS: CPT | Performed by: NURSE PRACTITIONER

## 2021-07-12 PROCEDURE — G8427 DOCREV CUR MEDS BY ELIG CLIN: HCPCS | Performed by: NURSE PRACTITIONER

## 2021-07-12 RX ORDER — 0.9 % SODIUM CHLORIDE 0.9 %
1000 INTRAVENOUS SOLUTION INTRAVENOUS
Status: CANCELLED | OUTPATIENT
Start: 2021-07-12

## 2021-07-12 RX ORDER — LIDOCAINE HYDROCHLORIDE AND EPINEPHRINE BITARTRATE 20; .01 MG/ML; MG/ML
20 INJECTION, SOLUTION SUBCUTANEOUS ONCE
Status: CANCELLED | OUTPATIENT
Start: 2021-07-12 | End: 2021-07-12

## 2021-07-12 RX ORDER — 0.9 % SODIUM CHLORIDE 0.9 %
250 INTRAVENOUS SOLUTION INTRAVENOUS
Status: CANCELLED | OUTPATIENT
Start: 2021-07-12

## 2021-07-12 RX ORDER — HEPARIN SODIUM (PORCINE) LOCK FLUSH IV SOLN 100 UNIT/ML 100 UNIT/ML
100 SOLUTION INTRAVENOUS ONCE
Status: CANCELLED | OUTPATIENT
Start: 2021-07-12 | End: 2021-07-12

## 2021-07-12 RX ORDER — MIDAZOLAM HYDROCHLORIDE 1 MG/ML
0.5 INJECTION INTRAMUSCULAR; INTRAVENOUS
Status: CANCELLED | OUTPATIENT
Start: 2021-07-12

## 2021-07-12 RX ORDER — FENTANYL CITRATE 50 UG/ML
50 INJECTION, SOLUTION INTRAMUSCULAR; INTRAVENOUS
Status: CANCELLED | OUTPATIENT
Start: 2021-07-12

## 2021-07-12 ASSESSMENT — ENCOUNTER SYMPTOMS
SHORTNESS OF BREATH: 0
COUGH: 0
EYE DISCHARGE: 0
TROUBLE SWALLOWING: 0
EYE PAIN: 0
RESPIRATORY NEGATIVE: 1
NAUSEA: 0
GASTROINTESTINAL NEGATIVE: 1
EYE REDNESS: 0
EYE ITCHING: 0
WHEEZING: 0
EYES NEGATIVE: 1
SORE THROAT: 0
CONSTIPATION: 0
VOMITING: 0
DIARRHEA: 0
ABDOMINAL DISTENTION: 0
ABDOMINAL PAIN: 0

## 2021-07-12 NOTE — PROGRESS NOTES
Radiation Treatment Summary    Patient Name:  Nabila Corea,  1945,  77 y.o., female       Referring Physician: No referring provider defined for this encounter.       PCP: Pedro Bridges MD       Diagnosis:         Recent History:     Site Start [address] [facility] ED Fractions Dose Fx Dose Technique                           Response/Tolerance: ***    Follow-up:              Sammi Graf MD PhD  Radiation Oncologist, [facility]    Department of [address] [place] [address]  [address] [facility]

## 2021-07-12 NOTE — PROGRESS NOTES
Lenora Yarbrough, a female of 77 y.o. came to the office 7/12/2021. Chief Complaint   Patient presents with    Consultation     port placement        Saray Perez referred here by oncology Dr. Haleigh Hilario for evaluation of mediport placement secondary to poor venous access. Mediport needed for IV chemotherapy for Large B cell Lymphoma. Next treatment is July 20th. Eliquis for H/O RLE DVT 6 months ago managed by oncology. Presents in wheelchair accompanied with daughter. Symptoms include generalized fatigue and weakness, skin lesions both legs with LLE worse. Family History   Problem Relation Age of Onset    Cancer Mother     Cancer Father     Cancer Maternal Grandmother        Past Surgical History:   Procedure Laterality Date    APPENDECTOMY      CHOLECYSTECTOMY      HYSTERECTOMY, VAGINAL      JOINT REPLACEMENT Right 2001    Knee        Past Medical History:   Diagnosis Date    Anxiety     Cancer ([facility]     stage1 DLBCL of left tonsil    Cancer ([facility] [address] 2002    ovarian    DVT (deep venous thrombosis) (HCC)     Gout     Hyperlipidemia     Hypertension     Pulmonary embolism (HCC)        Social History     Socioeconomic History    Marital status:       Spouse name: Not on file    Number of children: Not on file    Years of education: Not on file    Highest education level: Not on file   Occupational History    Not on file   Tobacco Use    Smoking status: Never Smoker    Smokeless tobacco: Never Used   Vaping Use    Vaping Use: Never used   Substance and Sexual Activity    Alcohol use: No    Drug use: No    Sexual activity: Never   Other Topics Concern    Not on file   Social History Narrative    Not on file     Social Determinants of Health     Financial Resource Strain:     Difficulty of Paying Living Expenses:    Food Insecurity:     Worried About Running Out of Food in the Last Year:     [address] in the Last Year:    Transportation Needs:     Lack of Transportation (Medical):  Lack of Transportation (Non-Medical):    Physical Activity:     Days of Exercise per Week:     Minutes of Exercise per Session:    Stress:     Feeling of Stress :    Social Connections:     Frequency of Communication with Friends and Family:     Frequency of Social Gatherings with Friends and Family:     Attends Yarsanism Services:     Active Member of Clubs or Organizations:     Attends Club or Organization Meetings:     Marital Status:    Intimate Partner Violence:     Fear of Current or Ex-Partner:     Emotionally Abused:     Physically Abused:     Sexually Abused: Allergies   Allergen Reactions    Adipex-P [Phentermine]      Kidney failure    Ciprofloxacin     Lisinopril      caugh    Oxycodone     Percocet [Oxycodone-Acetaminophen]      itching       Current Outpatient Medications on File Prior to Visit   Medication Sig Dispense Refill    Ibuprofen-diphenhydrAMINE Cit (ADVIL PM PO) Take 2 tablets by mouth nightly as needed      amLODIPine (NORVASC) 2.5 MG tablet Take 2.5 mg by mouth daily      diphenoxylate-atropine (LOMOTIL) 2.5-0.025 MG per tablet Take 1 tablet by mouth.  furosemide (LASIX) 20 MG tablet Take 20 mg by mouth daily      potassium chloride (KLOR-CON) 10 MEQ extended release tablet Take 20 mEq by mouth daily      metoprolol tartrate (LOPRESSOR) 25 MG tablet Take 25 mg by mouth 2 times daily      prochlorperazine (COMPAZINE) 10 MG tablet Take 10 mg by mouth every 8 hours as needed      tiZANidine (ZANAFLEX) 2 MG tablet Take 2 mg by mouth 2 times daily as needed      traZODone (DESYREL) 50 MG tablet 90TAKE 1/2 (ONE-HALF) OF A TABLET BY MOUTH AT BEDTIME      apixaban (ELIQUIS) 5 MG TABS tablet Take 5 mg by mouth 2 times daily      pregabalin (LYRICA) 50 MG capsule Take 50 mg by mouth 2 times daily.       rOPINIRole (REQUIP) 1 MG tablet Take 1 mg by mouth Nursing home orders states 0.5 mg PO at HS for RLS, 1 mg PO at HS for RLS x 7 days, @mg at HS for RLS      gabapentin (NEURONTIN) 300 MG capsule Take 300 mg by mouth      omeprazole 20 MG EC tablet Take 20 mg by mouth 2 times daily       No current facility-administered medications on file prior to visit. Review of Systems   Constitutional: Positive for fatigue. Negative for activity change, chills and fever. HENT: Negative. Negative for congestion, sore throat and trouble swallowing. Eyes: Negative. Negative for pain, discharge, redness, itching and visual disturbance. Respiratory: Negative. Negative for cough, shortness of breath and wheezing. Cardiovascular: Positive for leg swelling (bilateral legs). Negative for chest pain and palpitations. Gastrointestinal: Negative. Negative for abdominal distention, abdominal pain, constipation, diarrhea, nausea and vomiting. Endocrine: Negative. Genitourinary: Negative. Negative for difficulty urinating, dysuria, frequency, hematuria and urgency. Musculoskeletal: Negative. Skin:        Skin lesions bilateral legs with LLE > RLE    Neurological: Positive for weakness (generalized). Negative for dizziness, light-headedness and headaches. Hematological: Does not bruise/bleed easily. Psychiatric/Behavioral: Negative. OBJECTIVE:  /61   Pulse 72   Wt 161 lb (73 kg)   BMI 31.44 kg/m²     Physical Exam  Constitutional:       Appearance: Normal appearance. HENT:      Head: Normocephalic and atraumatic. Nose: Nose normal. No congestion. Cardiovascular:      Rate and Rhythm: Normal rate. Heart sounds: Normal heart sounds. Pulmonary:      Effort: Pulmonary effort is normal.   Abdominal:      Palpations: Abdomen is soft. Musculoskeletal:      Cervical back: Normal range of motion. Right lower leg: Edema present. Left lower leg: Edema present. Skin:     Capillary Refill: Capillary refill takes 2 to 3 seconds. Findings: Lesion (both legs with left > RLE) present. No erythema. Neurological:      Mental Status: She is alert and oriented to person, place, and time. Psychiatric:         Mood and Affect: Mood normal.         Behavior: Behavior normal.         Chart reviewed of pertinent information. Lab work and medications reviewed. ASSESSMENT:  1. Poor venous access. Oncology requesting mediport placement for IV chemotherapy  2. Diffuse Large B cell Lymphoma  3. Anticoagulation for H/O RLE DVT    PLAN:  1. Percutaneous Flouroscopy guided placement of  IJ mediport with conscious sedation. Procedure with risks including infection, bleeding, pain at site, pneumothorax, malposition of catheter, damage to major surrounding vessels and/or organs, venous thrombosis, and with any procedure there is a risk of unforseen complications and/or reactions that could potentially lead to death discussed with patient and patient wishes to proceed. 2. INR   3.    Follow up one week post placement in IR clinic for post op and incisional check      Loretto Brittle, APRN - CNP

## 2021-07-12 NOTE — PROGRESS NOTES
Radiation Treatment Summary    Patient Name:  Karen Triplett,  1945,  77 y.o., female       Referring Physician: No referring provider defined for this encounter. PCP: Jes Kerns MD       DIAGNOSIS: Recurrent diffuse large B-cell lymphoma with left leg swelling.     STAGING:  History of 2016 Stage I left tonsil lymphoma    Site Start [facility] ED Fractions Dose Fx Dose Technique   Left Inguinal LN June 24, 2021 July 8, 2021  10 30 Gy 3 Gy 3-D Conformal       Response/Tolerance: The patient tolerated radiation well but with no improvement in the left lower extremity edema which is near the end of her radiation treatment course, with to be evaluated by medical oncology for possible DVT. Follow-up: In radiation oncology department in approximately 1 month.     Luiz Aguirre MD PhD  Radiation Oncologist, [address] [place] [address]

## 2021-07-13 ENCOUNTER — TELEPHONE (OUTPATIENT)
Dept: INTERVENTIONAL RADIOLOGY/VASCULAR | Age: 76
End: 2021-07-13

## 2021-07-13 NOTE — TELEPHONE ENCOUNTER
PATIENT WAS GIVEN THIS INFORMATION PRIOR TO LEAVING THE OFFICE / VIA PHONE CONVERSATION - VOICED UNDERSTANDING  >  YOUR PROCEDURE IS SCHEDULED ON: 07/16/21 @ 1  >  You will need to arrive at 11:30 and check in at the Diagnostic Imaging Check In desk.   >  Do not eat or drink after midnight. Sips of water is acceptable ONLY to take medications. >  Make arrangements for transportation, as you should not drive immediately after.   > hold eliquis for 2 days prior to procedure

## 2021-07-15 DIAGNOSIS — Z85.72 PERSONAL HISTORY OF NON-HODGKIN LYMPHOMAS: ICD-10-CM

## 2021-07-15 DIAGNOSIS — Z01.818 ENCOUNTER FOR PREADMISSION TESTING: ICD-10-CM

## 2021-07-15 DIAGNOSIS — D59.9 ACQUIRED HEMOLYTIC ANEMIA (HCC): ICD-10-CM

## 2021-07-15 DIAGNOSIS — C77.4 SECONDARY AND UNSPECIFIED MALIGNANT NEOPLASM OF INGUINAL AND LOWER LIMB LYMPH NODES (HCC): ICD-10-CM

## 2021-07-15 DIAGNOSIS — I82.401 ACUTE EMBOLISM AND THROMBOSIS OF UNSPECIFIED DEEP VEINS OF RIGHT LOWER EXTREMITY (HCC): ICD-10-CM

## 2021-07-15 DIAGNOSIS — C83.35 DIFFUSE LARGE B-CELL LYMPHOMA OF LYMPH NODES OF INGUINAL REGION (HCC): ICD-10-CM

## 2021-07-15 DIAGNOSIS — I87.8 POOR VENOUS ACCESS: ICD-10-CM

## 2021-07-15 LAB
INR BLD: 1.2
PROTHROMBIN TIME: 14.9 SEC (ref 12.3–14.9)

## 2021-07-16 ENCOUNTER — HOSPITAL ENCOUNTER (OUTPATIENT)
Dept: INTERVENTIONAL RADIOLOGY/VASCULAR | Age: 76
Discharge: HOME | End: 2021-07-18
Attending: RADIOLOGY
Payer: MEDICARE

## 2021-07-16 ENCOUNTER — HOSPITAL ENCOUNTER (OUTPATIENT)
Dept: CARDIAC CATH/INVASIVE PROCEDURES | Age: 76
Discharge: HOME | End: 2021-07-16
Attending: RADIOLOGY
Payer: MEDICARE

## 2021-07-16 VITALS
SYSTOLIC BLOOD PRESSURE: 120 MMHG | RESPIRATION RATE: 21 BRPM | HEART RATE: 97 BPM | DIASTOLIC BLOOD PRESSURE: 56 MMHG | OXYGEN SATURATION: 100 %

## 2021-07-16 DIAGNOSIS — Z01.818 ENCOUNTER FOR PREADMISSION TESTING: ICD-10-CM

## 2021-07-16 DIAGNOSIS — I87.8 POOR VENOUS ACCESS: ICD-10-CM

## 2021-07-16 DIAGNOSIS — C83.35 DIFFUSE LARGE B-CELL LYMPHOMA OF LYMPH NODES OF INGUINAL REGION (HCC): ICD-10-CM

## 2021-07-16 DIAGNOSIS — I82.401 ACUTE EMBOLISM AND THROMBOSIS OF UNSPECIFIED DEEP VEINS OF RIGHT LOWER EXTREMITY (HCC): ICD-10-CM

## 2021-07-16 DIAGNOSIS — D59.9 ACQUIRED HEMOLYTIC ANEMIA (HCC): ICD-10-CM

## 2021-07-16 DIAGNOSIS — Z85.72 PERSONAL HISTORY OF NON-HODGKIN LYMPHOMAS: ICD-10-CM

## 2021-07-16 DIAGNOSIS — C77.4 SECONDARY AND UNSPECIFIED MALIGNANT NEOPLASM OF INGUINAL AND LOWER LIMB LYMPH NODES (HCC): ICD-10-CM

## 2021-07-16 PROCEDURE — 2709999900 IR PORT PLACEMENT > 5 YEARS

## 2021-07-16 PROCEDURE — 6370000000 HC RX 637 (ALT 250 FOR IP): Performed by: RADIOLOGY

## 2021-07-16 PROCEDURE — 2580000003 HC RX 258: Performed by: NURSE PRACTITIONER

## 2021-07-16 PROCEDURE — 6360000002 HC RX W HCPCS: Performed by: NURSE PRACTITIONER

## 2021-07-16 PROCEDURE — 77001 FLUOROGUIDE FOR VEIN DEVICE: CPT | Performed by: RADIOLOGY

## 2021-07-16 PROCEDURE — 6360000002 HC RX W HCPCS

## 2021-07-16 PROCEDURE — 99153 MOD SED SAME PHYS/QHP EA: CPT

## 2021-07-16 PROCEDURE — 2500000003 HC RX 250 WO HCPCS: Performed by: NURSE PRACTITIONER

## 2021-07-16 PROCEDURE — 36561 INSERT TUNNELED CV CATH: CPT | Performed by: RADIOLOGY

## 2021-07-16 PROCEDURE — 2580000003 HC RX 258

## 2021-07-16 PROCEDURE — 99152 MOD SED SAME PHYS/QHP 5/>YRS: CPT

## 2021-07-16 PROCEDURE — 76937 US GUIDE VASCULAR ACCESS: CPT | Performed by: RADIOLOGY

## 2021-07-16 RX ORDER — 0.9 % SODIUM CHLORIDE 0.9 %
1000 INTRAVENOUS SOLUTION INTRAVENOUS
Status: DISCONTINUED | OUTPATIENT
Start: 2021-07-16 | End: 2022-07-14 | Stop reason: HOSPADM

## 2021-07-16 RX ORDER — HEPARIN SODIUM (PORCINE) LOCK FLUSH IV SOLN 100 UNIT/ML 100 UNIT/ML
100 SOLUTION INTRAVENOUS ONCE
Status: COMPLETED | OUTPATIENT
Start: 2021-07-16 | End: 2021-07-16

## 2021-07-16 RX ORDER — LIDOCAINE HYDROCHLORIDE AND EPINEPHRINE BITARTRATE 20; .01 MG/ML; MG/ML
20 INJECTION, SOLUTION SUBCUTANEOUS ONCE
Status: DISCONTINUED | OUTPATIENT
Start: 2021-07-16 | End: 2022-07-14 | Stop reason: HOSPADM

## 2021-07-16 RX ORDER — MIDAZOLAM HYDROCHLORIDE 2 MG/2ML
0.5 INJECTION, SOLUTION INTRAMUSCULAR; INTRAVENOUS
Status: DISCONTINUED | OUTPATIENT
Start: 2021-07-16 | End: 2022-07-14 | Stop reason: HOSPADM

## 2021-07-16 RX ORDER — FENTANYL CITRATE 50 UG/ML
50 INJECTION, SOLUTION INTRAMUSCULAR; INTRAVENOUS
Status: DISCONTINUED | OUTPATIENT
Start: 2021-07-16 | End: 2022-07-14 | Stop reason: HOSPADM

## 2021-07-16 RX ORDER — LIDOCAINE HYDROCHLORIDE AND EPINEPHRINE BITARTRATE 20; .01 MG/ML; MG/ML
20 INJECTION, SOLUTION SUBCUTANEOUS ONCE
Status: COMPLETED | OUTPATIENT
Start: 2021-07-16 | End: 2021-07-16

## 2021-07-16 RX ORDER — 0.9 % SODIUM CHLORIDE 0.9 %
250 INTRAVENOUS SOLUTION INTRAVENOUS
Status: DISCONTINUED | OUTPATIENT
Start: 2021-07-16 | End: 2022-07-14 | Stop reason: HOSPADM

## 2021-07-16 RX ADMIN — HEPARIN SODIUM (PORCINE) LOCK FLUSH IV SOLN 100 UNIT/ML 500 UNITS: 100 SOLUTION at 14:13

## 2021-07-16 RX ADMIN — SODIUM CHLORIDE 250 ML: 9 INJECTION, SOLUTION INTRAVENOUS at 13:42

## 2021-07-16 RX ADMIN — FENTANYL CITRATE 50 MCG: 0.05 INJECTION, SOLUTION INTRAMUSCULAR; INTRAVENOUS at 13:55

## 2021-07-16 RX ADMIN — Medication 1 EACH: at 14:28

## 2021-07-16 RX ADMIN — MIDAZOLAM HYDROCHLORIDE 0.5 MG: 1 INJECTION, SOLUTION INTRAMUSCULAR; INTRAVENOUS at 13:55

## 2021-07-16 RX ADMIN — CEFAZOLIN 1000 MG: 1 INJECTION, POWDER, FOR SOLUTION INTRAMUSCULAR; INTRAVENOUS at 12:28

## 2021-07-16 RX ADMIN — LIDOCAINE HYDROCHLORIDE,EPINEPHRINE BITARTRATE 32 ML: 20; .01 INJECTION, SOLUTION INFILTRATION; PERINEURAL at 13:56

## 2021-07-16 RX ADMIN — MIDAZOLAM HYDROCHLORIDE 0.5 MG: 1 INJECTION, SOLUTION INTRAMUSCULAR; INTRAVENOUS at 13:49

## 2021-07-16 RX ADMIN — CEFAZOLIN 1000 MG: 1 INJECTION, POWDER, FOR SOLUTION INTRAMUSCULAR; INTRAVENOUS at 13:45

## 2021-07-16 RX ADMIN — FENTANYL CITRATE 50 MCG: 0.05 INJECTION, SOLUTION INTRAMUSCULAR; INTRAVENOUS at 13:49

## 2021-07-16 NOTE — SEDATION DOCUMENTATION
SEDATION GIVEN      1335 Pt brought to Cath lab room 2 via cart. Pt transferred with 3 assistance onto procedure table in supine position. Connected to monitors and O2 at 2L NC/end tidal CO2.     1338 AR- tech scanned right IJ to assess patency of area for mediport placement and then prepped right neck and chest with large tinted chloraprep and draped using sterile technique. 56 Dr. Valeria Yoon arrived and assessed pt's heart and lung sounds. 46 Timeout completed for interventional radiology mediport placement. 1349 Fentanyl 50 mcg IV and versed 0.5 mg IV given per NATE per Dr Eric Hicks order. 1355 Additional versed 0.5 mg IV and fentanyl 50 mcg IV given per Dr Eric Hicks order by DR-RN. 1356 Using ultrasound guidance, Dr. Valeria Yoon numbed site with 2% lidocaine with epinephrine, see eMar.    1358 Using ultrasound guidance, Dr. Valeria Yoon obtained right IJ access with 5 F MP Introducer kit.     1401 Site dilated and guidewire from [employer] used to maintain access. Using fluoro guidance, Dr. Valeria Yoon introduced 8F sheath from [employer] into right IJ.     1402 Dr. Valeria Yoon marked site for mediport pocket and tunnel and additional lidocaine given at sites. Pt tolerating well. [address] [place] [address] Dr. Valeria Yoon made right chest incision creating the opening of mediport pocket. Dry gauze inserted into pocket to stop any bleeding, then removed. 1406 Fit assessment of mediport completed and removed once fit obtained. 1407 Dr Valeria Yoon connected Mediport (rinsed in ATB solution) to catheter and all rinsed with antibiotic solution again. 1650 [facility] Dr. Valeria Yoon inserted BARD 8F mediport from [employer] ([place] [id number] exp 34-). Measuring catheter length using fluoro. 80 Dr. Valeria Yoon created subcutaneous tunnel from puncture site to port pocket. Pt tolerating well. 1411 Catheter advanced up tunnel under sin. Placement confirmed and catheter length measured using fluoro.   Dr. Valeria Yoon cut mediport catheter length to 22 cm. 1412 Mediport catheter inserted through the sheath and then sheath peeled away. Dr. Paolo Mathew confirmed catheter tip placement via fluoro. Pt tolerating procedure well. 12 Dr. Paolo Mathew states port aspirates with good blood return and flushes well with normal saline and then flushes with several 10ml antibiotic filled syringes. Mediport instilled with 500units/5ml heparin lock flush solution. 200 Dr. aPolo Mathew irrigated mediport pocket with antibiotic solution and then sutured pocket with vicryl. Pt tolerating well. [address] Dr Paolo Mathew suturing right chest pocket with vicryl sutures. Pt tolerating well. 1425 Dr Paolo Mathew still suturing. Pt tolerating procedure well. 1428 Topical skin affix applied to right IJ site and atop vicryl sutures of right chest pocket. Pt condition unchanged during procedure, tolerated all well. [phone] Waiting for skin affix to dry. Pt continues to rest on table, with verbal support offered. VSS.    143 Pt transferred to cart with 2 person assist.    4216 Pt taken to CT HR for recovery.        TOTAL SEDATION GIVEN: 100 MCG fentanyl IV                                                1 MG versed IV

## 2021-07-16 NOTE — PROGRESS NOTES
1445 Pt now in Ct holding room for recovery and discharge. Pt A&Ox4, skin warm and dry, respirations even and unlabored. VSS. Pt denies any pain. Site clean, dry, intact, no bleeding or drainage noted. 1455 Pt drinking cranberry juice without difficulty. 1516 IV removed. Catheter intact. Pt tolerated well. 1523 D/C instructions given to patient and patient's daughter. Patient and daughter verbalized understanding of given information. 1528 Spoke with Reva Denver CNP regarding eliquis. Per Igor Flores pt to resume eliquis tonight. Patient and daughter made aware and verbalized understanding. 0 Pt changed into street clothes with 1 person assistance. Pt states she feel good. 215 [facility] Pt wheeled to the car. Pt walked short distance with minimal assitance. Pt d/c home with daughter.

## 2021-07-16 NOTE — PROGRESS NOTES
Patient present in CT holding in preparation for Medport placement. Patient is alert and oriented x 4 and accompanied with her daughter Elaine Parra. History, medications, and allergies reviewed. Procedure explained , questions answered and consent signed. Patient and daughter verbalized understanding of procedure. Peripheral IV obtained in Left AC.

## 2021-07-16 NOTE — FLOWSHEET NOTE
Pt preop antibiotic started, see eMar, infusing well. Dr. Jeff Powell notified pt ready in CT holding. Pt visiting with daughter at bedside, pt denies any needs or distress at this time. Electronically signed by Vik Johnson RN on 7/16/2021 at 12:39 PM  At : Dr. Jeff Powell at bedside speaking to pt and assessing pt. Preop antibiotic complete. Awaiting cath lab to be ready for pt. Electronically signed by Vik Johnson RN on 7/16/2021 at 1:08 PM  At 1329: pt to cath lab 3 via cart with DOMINGA-RN. Electronically signed by Vik Johnson RN on 7/16/2021 at 1:30 PM

## 2021-08-09 ENCOUNTER — HOSPITAL ENCOUNTER (OUTPATIENT)
Dept: RADIATION ONCOLOGY | Age: 76
Discharge: HOME | End: 2021-08-09
Attending: RADIOLOGY
Payer: MEDICARE

## 2021-08-09 VITALS
SYSTOLIC BLOOD PRESSURE: 110 MMHG | HEART RATE: 97 BPM | DIASTOLIC BLOOD PRESSURE: 63 MMHG | TEMPERATURE: 97.4 F | WEIGHT: 162.4 LBS | OXYGEN SATURATION: 96 % | BODY MASS INDEX: 31.72 KG/M2 | RESPIRATION RATE: 18 BRPM

## 2021-08-09 PROCEDURE — 99212 OFFICE O/P EST SF 10 MIN: CPT | Performed by: RADIOLOGY

## 2021-08-09 RX ORDER — SULFAMETHOXAZOLE AND TRIMETHOPRIM 400; 80 MG/1; MG/1
1 TABLET ORAL 2 TIMES DAILY
COMMUNITY

## 2021-08-09 NOTE — PROGRESS NOTES
DEPARTMENT OF RADIATION ONCOLOGY   Follow up visit        2021    NAME:  Laina Rodriguez    :  1945 77 y.o. female     PCP: Sarah Miramontes MD    REFERRING PROVIDER: Lorrie Delgado diffuse large B-cell lymphoma, with left inguinal mass and left lower extremity edema     STAGING:  History of 2016 Stage I left tonsil.     Treatment history:  CHOP-R x 3 done 2016 through 2016:  30 Gy in 2-Gy fraction via IMRT to oropharynx                                  Admitted to [facility]  for ? CVA (left facial numbness), was diagnosed with RLE DVT, exam showed L inguinal mass                                                Bx 10-7-2020 (+) recurrence.   PET/CT (+) sinuses, LLL lung, L inguinal, skin lesions                                                 In legs and back.                                   Bendeka, Rituxan, and Polivy 2020 with good clinical response (with respect to skin lesions,                                                  Edema, and facial numbness.  Admitted  and  with pneumonia & sepsis; PICC                                                  Line removed.                                    Leptomeningeal disease treated with intrathecal chemo, 6 cycles completed                                     6 weeks ago:  In clincical remission. RECENT HISTORY: Laina Rodriguez returns for follow up status post 2021 completion of 30 Gy to the left inguinal mass for the above diagnosis. The patient is here with her sister-in-law, My Grant, and they are both very happy to report the left lower extremity edema has significantly improved. She has just been restarted on systemic therapy and worries about diarrhea although she has not incurred the symptoms. Past medical, surgical, social and family histories reviewed and updated as indicated.     ALLERGIES:  Adipex-p [phentermine], Ciprofloxacin, Lisinopril, Oxycodone, and Oxycodone-acetaminophen       MEDICATIONS: No current facility-administered medications for this encounter. No current outpatient medications on file. Facility-Administered Medications Ordered in Other Encounters:     0.9 % sodium chloride bolus, 1,000 mL, Intravenous, PRN, Attila Schultz, APRN - CNP    0.9 % sodium chloride bolus, 250 mL, Intravenous, PRN, Attila Schultz, APRN - CNP, Stopped at 07/16/21 1442    fentaNYL (SUBLIMAZE) injection 50 mcg, 50 mcg, Intravenous, PRN, Attila Schultz, APRN - CNP, 50 mcg at 07/16/21 1355    lidocaine-EPINEPHrine 2 percent-[id number] injection 20 mL, 20 mL, Intradermal, Once, KEITH Hunt - CNP    midazolam PF (VERSED) injection 0.5 mg, 0.5 mg, Intravenous, PRN, Attila Schultz, APRN - CNP, 0.5 mg at 07/16/21 1355    topical skin adhesive stick, , Topical, Once, Attila Schultz APRN - CNP    topical skin adhesive stick, , Topical, Once, Attila Schultz APRN - CNP      PHYSICAL EXAMINATION:      Vitals:    08/09/21 1501   BP: 110/63   Pulse: 97   Resp: 18   Temp: 97.4 °F (36.3 °C)   SpO2: 96%   Weight: 162 lb 6.4 oz (73.7 kg)       Wt Readings from Last 3 Encounters:   08/09/21 162 lb 6.4 oz (73.7 kg)   07/12/21 161 lb (73 kg)   06/16/21 161 lb (73 kg)       ECOG PERFORMANCE STATUS:  3    Constitutional: 77 y.o. female who is alert, cooperative and in no apparent distress. She sits in wheelchair. The left lower extremity edema has significantly subsided. ASSESSMENT/PLAN: The patient is doing well, with improvement of her left lower extremity edema. Given the nature and palliative intent of radiation, I have asked her to return here for follow-up on an as-needed basis since she is receiving weekly chemotherapy.     Samson Piper MD PhD  Radiation Oncologist  Diplomate, American Board of Radiology -- Radiation Oncology    Department of [facility] [address]  [id number] [email or web address] [address] [facility] [place], [place]

## 2021-08-09 NOTE — PROGRESS NOTES
NURSING ASSESSMENT     Date: 2021        Patient Name: Samuel Lucero     YOB: 1945      Age:  77 y.o. MRN: [id number]       Chaperone [x] Yes   [] No      Advance Directives:   Do you currently have completed advance directives (living will)? [] Yes   [x] No         *If yes, please bring us a copy for your records. *If no, would you like info or assistance in completing advance directives (living will)? [] Yes   [x] No    Pain Score:   Pain Score (1-10): 0  Pain Location: intermittent left lower leg pain   Pain Duration: intermittent  Pain Management/Control: Norco      Is pain affecting your ability to take care of yourself or move throughout your home? [] Yes   [x] No    General: Fatigue  Patient has gained weight [] Yes   [x] No  Patient has lost weight [x] Yes   [] No  How much weight in pounds and over what length of time: Down 14.4 lbs since last visit 21  Eyes (Ophthalmic): Blurred Vision     Skin (Dermatological): Dry patches to left leg     ENT: No Problems     Respiratory: SOB, O2 At 3L continous   Cardiovascular: Edema at baseline to lower extremities      Device   [] Yes   [x] No   Copy of Card Obtained [] Yes   [x] No    Gastrointestinal: No Problems    Genito-Urinary: Recent UTI, treated with antibiotics       Breast: No Problems     Musculoskeletal: Immobility, uses a walker for ambulation    Neurological: Numbness and Tingling to fingers and toes      Hematological and Lymphatic: No Problems     Endocrine: No Problems     Gyn History: See initial consult  /Para:   Age of Menarche:   Age of Menopause  Vaginal Bleeding:    First Pregnancy:  Breast Feeding:    Last Menstrual period:   Oral Contraceptives:      Number of years:   Hormone Replacement therapy     Number of Years:   Last Pap Smear:   Last Mammogram    A 10-point review of systems has been conducted and pertinent positives have been   recorded.  All other review of systems are negative    Was the patient admitted during the course of treatment OR within 30 days of treatment?  Yes     If yes:  Date of Admission:Last week  Hospital: [facility]    Additional Comments: Admitted for UTI, stayed 2 days